# Patient Record
Sex: FEMALE | Race: BLACK OR AFRICAN AMERICAN | Employment: FULL TIME | URBAN - NONMETROPOLITAN AREA
[De-identification: names, ages, dates, MRNs, and addresses within clinical notes are randomized per-mention and may not be internally consistent; named-entity substitution may affect disease eponyms.]

---

## 2020-09-14 ENCOUNTER — HOSPITAL ENCOUNTER (EMERGENCY)
Age: 50
Discharge: HOME | End: 2020-09-14
Attending: INTERNAL MEDICINE
Payer: COMMERCIAL

## 2020-09-14 ENCOUNTER — APPOINTMENT (OUTPATIENT)
Dept: GENERAL RADIOLOGY | Age: 50
End: 2020-09-14
Attending: INTERNAL MEDICINE
Payer: COMMERCIAL

## 2020-09-14 VITALS
HEART RATE: 74 BPM | TEMPERATURE: 99.4 F | BODY MASS INDEX: 29.02 KG/M2 | HEIGHT: 64 IN | RESPIRATION RATE: 18 BRPM | SYSTOLIC BLOOD PRESSURE: 142 MMHG | OXYGEN SATURATION: 97 % | DIASTOLIC BLOOD PRESSURE: 76 MMHG | WEIGHT: 170 LBS

## 2020-09-14 DIAGNOSIS — Z20.822 PERSON UNDER INVESTIGATION FOR COVID-19: Primary | ICD-10-CM

## 2020-09-14 LAB
DEPRECATED S PYO AG THROAT QL EIA: NEGATIVE
FLUAV AG NPH QL IA: NEGATIVE
FLUBV AG NOSE QL IA: NEGATIVE

## 2020-09-14 PROCEDURE — 99284 EMERGENCY DEPT VISIT MOD MDM: CPT

## 2020-09-14 PROCEDURE — 87804 INFLUENZA ASSAY W/OPTIC: CPT

## 2020-09-14 PROCEDURE — 87635 SARS-COV-2 COVID-19 AMP PRB: CPT

## 2020-09-14 PROCEDURE — 71045 X-RAY EXAM CHEST 1 VIEW: CPT

## 2020-09-14 PROCEDURE — 87880 STREP A ASSAY W/OPTIC: CPT

## 2020-09-14 PROCEDURE — 87070 CULTURE OTHR SPECIMN AEROBIC: CPT

## 2020-09-14 PROCEDURE — 74011250637 HC RX REV CODE- 250/637: Performed by: INTERNAL MEDICINE

## 2020-09-14 RX ORDER — BENZONATATE 100 MG/1
100 CAPSULE ORAL
Qty: 30 CAP | Refills: 0 | Status: SHIPPED | OUTPATIENT
Start: 2020-09-14 | End: 2020-09-21

## 2020-09-14 RX ORDER — LANOLIN ALCOHOL/MO/W.PET/CERES
3 CREAM (GRAM) TOPICAL
COMMUNITY

## 2020-09-14 RX ORDER — LANOLIN ALCOHOL/MO/W.PET/CERES
500 CREAM (GRAM) TOPICAL DAILY
COMMUNITY
End: 2022-03-14

## 2020-09-14 RX ORDER — PREDNISONE 20 MG/1
20 TABLET ORAL DAILY
Qty: 5 TAB | Refills: 0 | Status: SHIPPED | OUTPATIENT
Start: 2020-09-14 | End: 2020-09-19

## 2020-09-14 RX ORDER — ALBUTEROL SULFATE 90 UG/1
2 AEROSOL, METERED RESPIRATORY (INHALATION)
COMMUNITY

## 2020-09-14 RX ORDER — PANTOPRAZOLE SODIUM 40 MG/1
40 TABLET, DELAYED RELEASE ORAL DAILY
COMMUNITY

## 2020-09-14 RX ORDER — DEXAMETHASONE SODIUM PHOSPHATE 4 MG/ML
6 INJECTION, SOLUTION INTRA-ARTICULAR; INTRALESIONAL; INTRAMUSCULAR; INTRAVENOUS; SOFT TISSUE
Status: COMPLETED | OUTPATIENT
Start: 2020-09-14 | End: 2020-09-14

## 2020-09-14 RX ORDER — AZITHROMYCIN 250 MG/1
TABLET, FILM COATED ORAL
Qty: 6 TAB | Refills: 0 | Status: SHIPPED | OUTPATIENT
Start: 2020-09-14 | End: 2021-01-20

## 2020-09-14 RX ORDER — AMLODIPINE BESYLATE 5 MG/1
5 TABLET ORAL DAILY
COMMUNITY

## 2020-09-14 RX ORDER — METFORMIN HYDROCHLORIDE 500 MG/1
500 TABLET ORAL DAILY
COMMUNITY

## 2020-09-14 RX ORDER — LOSARTAN POTASSIUM 100 MG/1
100 TABLET ORAL DAILY
COMMUNITY
End: 2021-01-20

## 2020-09-14 RX ORDER — ZOLPIDEM TARTRATE 10 MG/1
10 TABLET ORAL
COMMUNITY
End: 2021-01-20

## 2020-09-14 RX ORDER — GLUCOSAMINE SULFATE 1500 MG
1000 POWDER IN PACKET (EA) ORAL DAILY
COMMUNITY
End: 2021-08-22

## 2020-09-14 RX ADMIN — DEXAMETHASONE SODIUM PHOSPHATE 6 MG: 4 INJECTION, SOLUTION INTRA-ARTICULAR; INTRALESIONAL; INTRAMUSCULAR; INTRAVENOUS; SOFT TISSUE at 12:36

## 2020-09-14 NOTE — ED PROVIDER NOTES
EMERGENCY DEPARTMENT HISTORY AND PHYSICAL EXAM      Date: 9/14/2020  Patient Name: Manjeet Murphy    History of Presenting Illness     Chief Complaint   Patient presents with    Cough       History Provided By: Patient    HPI: Manjeet Murphy, 52 y.o. female presents to the ED with cc of continuing throat pain, chills/aches, cough and shortness of breath. Patient states that for approximately two weeks she has been expieriencing these symptoms. They began with her throat \"feeling on fire\" and symptoms have increased since and have not gotten better. Denies direct contact with a COVID patient, but admits that her son has been in contact with a COVID patient as well as having a previously positive COVID co-worker recently return to work. Denies being a smoker. There are no other complaints, changes, or physical findings at this time. PCP: Marisel Grover MD    No current facility-administered medications on file prior to encounter. Current Outpatient Medications on File Prior to Encounter   Medication Sig Dispense Refill    metFORMIN ER (GLUCOPHAGE XR) 750 mg tablet Take 750 mg by mouth daily.  melatonin 3 mg tablet Take 3 mg by mouth.  zolpidem (Ambien) 10 mg tablet Take 10 mg by mouth nightly.  cholecalciferol (Vitamin D3) 25 mcg (1,000 unit) cap Take 1,000 Units by mouth daily.  pantoprazole (Protonix) 40 mg tablet Take 40 mg by mouth daily.  cyanocobalamin (Vitamin B-12) 500 mcg tablet Take 500 mcg by mouth daily.  amLODIPine (Norvasc) 5 mg tablet Take 5 mg by mouth daily.  losartan (COZAAR) 100 mg tablet Take 100 mg by mouth daily.  albuterol (PROVENTIL HFA, VENTOLIN HFA, PROAIR HFA) 90 mcg/actuation inhaler Take 2 Puffs by inhalation every four (4) hours as needed for Wheezing.          Past History     Past Medical History:  Past Medical History:   Diagnosis Date    Diabetes ([address]     GERD (gastroesophageal reflux disease)     Hypertension     Interstitial cystitis     Migraines        Past Surgical History:  Past Surgical History:   Procedure Laterality Date    HX HYSTERECTOMY      HX TONSILLECTOMY         Family History:  Family History   Problem Relation Age of Onset    Hypertension Mother     Diabetes Mother     Cancer Father     Hypertension Father     Diabetes Father     Asthma Father        Social History:  Social History     Tobacco Use    Smoking status: Never Smoker    Smokeless tobacco: Never Used   Substance Use Topics    Alcohol use: Not Currently    Drug use: Never       Allergies: Allergies   Allergen Reactions    Aspirin Nausea Only    Levaquin [Levofloxacin] Rash    Penicillins Rash         Review of Systems   Review of Systems   Constitutional: Positive for chills, fatigue and fever. HENT: Positive for sore throat. Negative for ear pain and rhinorrhea. Eyes: Negative. Respiratory: Positive for shortness of breath. Negative for chest tightness and wheezing. Cardiovascular: Negative. Gastrointestinal: Negative. Endocrine: Negative. Genitourinary: Negative. Musculoskeletal: Negative. Skin: Negative. Allergic/Immunologic: Negative. Neurological: Positive for weakness and headaches. Negative for syncope. Hematological: Negative. Psychiatric/Behavioral: Negative. Physical Exam   Physical Exam  Constitutional:       Appearance: Normal appearance. She is normal weight. She is ill-appearing. She is not diaphoretic. HENT:      Head: Normocephalic and atraumatic. Right Ear: Tympanic membrane, ear canal and external ear normal.      Left Ear: Tympanic membrane, ear canal and external ear normal.      Nose: Nose normal.      Mouth/Throat:      Mouth: Mucous membranes are moist.      Pharynx: Oropharynx is clear. Eyes:      Extraocular Movements: Extraocular movements intact. Conjunctiva/sclera: Conjunctivae normal.      Pupils: Pupils are equal, round, and reactive to light. Neck:      Musculoskeletal: Normal range of motion and neck supple. No muscular tenderness. Cardiovascular:      Rate and Rhythm: Normal rate and regular rhythm. Pulses: Normal pulses. Heart sounds: Normal heart sounds. Pulmonary:      Effort: Pulmonary effort is normal.      Breath sounds: Normal breath sounds. Abdominal:      General: Bowel sounds are normal.      Palpations: Abdomen is soft. Tenderness: There is no abdominal tenderness. Musculoskeletal: Normal range of motion. Skin:     General: Skin is warm. Coloration: Skin is not jaundiced or pale. Findings: No erythema. Neurological:      Mental Status: She is alert and oriented to person, place, and time. Sensory: No sensory deficit. Motor: Weakness present. Psychiatric:         Mood and Affect: Mood normal.         Behavior: Behavior normal.         Thought Content: Thought content normal.         Judgment: Judgment normal.         Diagnostic Study Results     Labs -     Recent Results (from the past 12 hour(s))   INFLUENZA A & B AG (RAPID TEST)    Collection Time: 09/14/20 12:45 PM   Result Value Ref Range    Influenza A Antigen Negative      Influenza B Antigen Negative     STREP AG SCREEN, GROUP A    Collection Time: 09/14/20 12:45 PM    Specimen: Throat   Result Value Ref Range    Group A Strep Ag ID Negative         Radiologic Studies -   XR CHEST PORT   Final Result   IMPRESSION:      No active cardiopulmonary disease. CT Results  (Last 48 hours)    None        CXR Results  (Last 48 hours)               09/14/20 1232  XR CHEST PORT Final result    Impression:  IMPRESSION:       No active cardiopulmonary disease. Narrative:  EXAM: XR CHEST PORT       CLINICAL INDICATION/HISTORY: cough; possible covid     > Additional: None. COMPARISON: None. TECHNIQUE: Portable chest       _______________       FINDINGS:       SUPPORT DEVICES: None.        HEART AND MEDIASTINUM: Normal size and contour. Normal pulmonary vasculature. LUNGS AND PLEURAL SPACES: The lungs are well expanded and clear. No focal   consolidation, effusion, or pneumothorax. BONY THORAX AND SOFT TISSUES: No acute osseous abnormality. _______________                 Medical Decision Making   I am the first provider for this patient. I reviewed the vital signs, available nursing notes, past medical history, past surgical history, family history and social history. Vital Signs-Reviewed the patient's vital signs. Patient Vitals for the past 12 hrs:   Temp Pulse Resp BP SpO2   09/14/20 1145 99.4 °F (37.4 °C) 94 20 124/82 98 %       Records Reviewed: Nursing Notes      ED Course:   Initial assessment performed. The patients presenting problems have been discussed, and they are in agreement with the care plan formulated and outlined with them. I have encouraged them to ask questions as they arise throughout their visit. Leonarda Prieto MD      Disposition:  Discharged     Remove if not discharged  DISCHARGE PLAN:  1. Current Discharge Medication List      START taking these medications    Details   azithromycin (Zithromax Z-Tunde) 250 mg tablet Two tablets the first day and then one tablet daily for the next 4 days  Qty: 6 Tab, Refills: 0      predniSONE (DELTASONE) 20 mg tablet Take 20 mg by mouth daily for 5 days. With Breakfast  Qty: 5 Tab, Refills: 0      benzonatate (Tessalon Perles) 100 mg capsule Take 1 Cap by mouth three (3) times daily as needed for Cough for up to 7 days. Qty: 30 Cap, Refills: 0           2. Follow-up Information     Follow up With Specialties Details Why Contact Info    Connie Dunham MD Internal Medicine Schedule an appointment as soon as possible for a visit in 2 days  [address] [address] [phone]          3. Return to ED if worse     Diagnosis     Clinical Impression:   1. Person under investigation for COVID-19        Attestations:     By signing my name below, Bre Mk, attest that this documentation has been prepared under the direction and in presence of Dr. Gwendolyn Cuevas on 09/14/20. Electronically signed: Sarika Becerra, 09/14/20, 12:18 PM    Leonarda Prieto MD    Please note that this dictation was completed with [employer], the computer voice recognition software. Quite often unanticipated grammatical, syntax, homophones, and other interpretive errors are inadvertently transcribed by the computer software. Please disregard these errors. Please excuse any errors that have escaped final proofreading. Thank you.

## 2020-09-14 NOTE — LETTER
[facility] EMERGENCY DEPT 
[facility] [address] 
[phone] Work/School Note Date: 9/14/2020 To Whom It May concern: 
 
Shan Dejesus was evaulated by the following provider(s): 
Attending Provider: Leopoldo Ramirez, [address] [place] [address] virus is suspected. Per the CDC guidelines we recommend home isolation until the following conditions are all met: 1. At least 10 days have passed since symptoms first appeared and 2. At least 24 hours have passed since last fever without the use of fever-reducing medications and 
3. Symptoms (e.g., cough, shortness of breath) have improved Sincerely, 
 
 
 
 
Tamia Mota RN

## 2020-09-14 NOTE — ED TRIAGE NOTES
Pt reports that for two weeks she has had sinus congestion and a cough, states that yesterday it started to hurt when coughs. Pt reports feeling tired and weak.

## 2020-09-16 LAB
CULTURE,CULT: NORMAL
SPECIAL REQUESTS,SREQ: NORMAL

## 2020-09-22 LAB — SARS-COV-2, COV2NT: NOT DETECTED

## 2020-10-08 ENCOUNTER — HOSPITAL ENCOUNTER (OUTPATIENT)
Dept: LAB | Age: 50
Discharge: HOME | End: 2020-10-08

## 2021-01-20 ENCOUNTER — HOSPITAL ENCOUNTER (EMERGENCY)
Age: 51
Discharge: HOME | End: 2021-01-20
Attending: FAMILY MEDICINE
Payer: COMMERCIAL

## 2021-01-20 ENCOUNTER — APPOINTMENT (OUTPATIENT)
Dept: GENERAL RADIOLOGY | Age: 51
End: 2021-01-20
Attending: FAMILY MEDICINE
Payer: COMMERCIAL

## 2021-01-20 VITALS
DIASTOLIC BLOOD PRESSURE: 77 MMHG | SYSTOLIC BLOOD PRESSURE: 115 MMHG | HEART RATE: 78 BPM | BODY MASS INDEX: 27.14 KG/M2 | TEMPERATURE: 97.8 F | HEIGHT: 64 IN | OXYGEN SATURATION: 100 % | WEIGHT: 159 LBS | RESPIRATION RATE: 18 BRPM

## 2021-01-20 DIAGNOSIS — S20.212A CONTUSION OF LEFT CHEST WALL, INITIAL ENCOUNTER: Primary | ICD-10-CM

## 2021-01-20 PROCEDURE — 71110 X-RAY EXAM RIBS BIL 3 VIEWS: CPT

## 2021-01-20 PROCEDURE — 99281 EMR DPT VST MAYX REQ PHY/QHP: CPT

## 2021-01-20 RX ORDER — TRAMADOL HYDROCHLORIDE 50 MG/1
50 TABLET ORAL
Qty: 20 TAB | Refills: 0 | Status: SHIPPED | OUTPATIENT
Start: 2021-01-20 | End: 2021-01-25

## 2021-01-20 NOTE — Clinical Note
[facility] EMERGENCY DEPT 
[facility] [place] [address] 
[phone] Work/School Note Date: 1/20/2021 To Whom It May concern: 
 
 
Duran Whitehead was seen and treated today in the emergency room by the following provider(s): 
Attending Provider: Mary Barlow DO. Duran Whitehead is excused from work/school on 01/20/21. She is clear to return to work/school on 01/21/21.    
 
 
Sincerely, 
 
 
 
 
Bruce Aggarwal DO

## 2021-01-20 NOTE — ED TRIAGE NOTES
Reports fell on Saturday onto cement, states chair gave away. C/O pain under left rib cage, hurts to breathe - feels like having \"spasms\", bruising noted both arms, pain right leg and back. Denies hitting head or LOC. Has tried tylenol, motrin and flexeril with no relief. States left arm feels like has pins and needles from elbow up to shoulder.
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
## 2021-03-16 ENCOUNTER — OFFICE VISIT (OUTPATIENT)
Dept: ORTHOPEDIC SURGERY | Age: 51
End: 2021-03-16
Payer: COMMERCIAL

## 2021-03-16 VITALS — WEIGHT: 160 LBS | HEIGHT: 64 IN | BODY MASS INDEX: 27.31 KG/M2

## 2021-03-16 DIAGNOSIS — M54.42 LOW BACK PAIN WITH LEFT-SIDED SCIATICA, UNSPECIFIED BACK PAIN LATERALITY, UNSPECIFIED CHRONICITY: Primary | ICD-10-CM

## 2021-03-16 DIAGNOSIS — M54.40 LOW BACK PAIN WITH SCIATICA, SCIATICA LATERALITY UNSPECIFIED, UNSPECIFIED BACK PAIN LATERALITY, UNSPECIFIED CHRONICITY: ICD-10-CM

## 2021-03-16 PROCEDURE — 99203 OFFICE O/P NEW LOW 30 MIN: CPT | Performed by: ORTHOPAEDIC SURGERY

## 2021-03-16 NOTE — PATIENT INSTRUCTIONS
Knee Pain or Injury: Care Instructions Your Care Instructions Injuries are a common cause of knee problems. Sudden (acute) injuries may be caused by a direct blow to the knee. They can also be caused by abnormal twisting, bending, or falling on the knee. Pain, bruising, or swelling may be severe, and may start within minutes of the injury. Overuse is another cause of knee pain. Other causes are climbing stairs, kneeling, and other activities that use the knee. Everyday wear and tear, especially as you get older, also can cause knee pain. Rest, along with home treatment, often relieves pain and allows your knee to heal. If you have a serious knee injury, you may need tests and treatment. Follow-up care is a key part of your treatment and safety. Be sure to make and go to all appointments, and call your doctor if you are having problems. It's also a good idea to know your test results and keep a list of the medicines you take. How can you care for yourself at home? · Be safe with medicines. Read and follow all instructions on the label. ? If the doctor gave you a prescription medicine for pain, take it as prescribed. ? If you are not taking a prescription pain medicine, ask your doctor if you can take an over-the-counter medicine. · Rest and protect your knee. Take a break from any activity that may cause pain. · Put ice or a cold pack on your knee for 10 to 20 minutes at a time. Put a thin cloth between the ice and your skin. · Prop up a sore knee on a pillow when you ice it or anytime you sit or lie down for the next 3 days. Try to keep it above the level of your heart. This will help reduce swelling. · If your knee is not swollen, you can put moist heat, a heating pad, or a warm cloth on your knee. · If your doctor recommends an elastic bandage, sleeve, or other type of support for your knee, wear it as directed.  
· Follow your doctor's instructions about how much weight you can put on your leg. Use a cane, crutches, or a walker as instructed. · Follow your doctor's instructions about activity during your healing process. If you can do mild exercise, slowly increase your activity. · Reach and stay at a healthy weight. Extra weight can strain the joints, especially the knees and hips, and make the pain worse. Losing even a few pounds may help. When should you call for help? Call 911 anytime you think you may need emergency care. For example, call if: 
  · You have symptoms of a blood clot in your lung (called a pulmonary embolism). These may include: 
? Sudden chest pain. ? Trouble breathing. ? Coughing up blood. Call your doctor now or seek immediate medical care if: 
  · You have severe or increasing pain.  
  · Your leg or foot turns cold or changes color.  
  · You cannot stand or put weight on your knee.  
  · Your knee looks twisted or bent out of shape.  
  · You cannot move your knee.  
  · You have signs of infection, such as: 
? Increased pain, swelling, warmth, or redness. ? Red streaks leading from the knee. ? Pus draining from a place on your knee. ? A fever.  
  · You have signs of a blood clot in your leg (called a deep vein thrombosis), such as: 
? Pain in your calf, back of the knee, thigh, or groin. ? Redness and swelling in your leg or groin. Watch closely for changes in your health, and be sure to contact your doctor if: 
  · You have tingling, weakness, or numbness in your knee.  
  · You have any new symptoms, such as swelling.  
  · You have bruises from a knee injury that last longer than 2 weeks.  
  · You do not get better as expected. Where can you learn more? Go to http://www.gray.com/ Enter K195 in the search box to learn more about \"Knee Pain or Injury: Care Instructions. \" Current as of: June 26, 2019               Content Version: 12.6 © 4247-7278 [employer].   
Care instructions adapted under license by [employer] (which disclaims liability or warranty for this information). If you have questions about a medical condition or this instruction, always ask your healthcare professional. [address] any warranty or liability for your use of this information.
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
## 2021-04-21 ENCOUNTER — HOSPITAL ENCOUNTER (OUTPATIENT)
Dept: LAB | Age: 51
Discharge: HOME | End: 2021-04-21

## 2021-04-21 PROCEDURE — 99001 SPECIMEN HANDLING PT-LAB: CPT

## 2021-04-21 PROCEDURE — 36415 COLL VENOUS BLD VENIPUNCTURE: CPT

## 2021-04-22 DIAGNOSIS — M54.40 LOW BACK PAIN WITH SCIATICA, SCIATICA LATERALITY UNSPECIFIED, UNSPECIFIED BACK PAIN LATERALITY, UNSPECIFIED CHRONICITY: ICD-10-CM

## 2021-04-22 DIAGNOSIS — M54.42 LOW BACK PAIN WITH LEFT-SIDED SCIATICA, UNSPECIFIED BACK PAIN LATERALITY, UNSPECIFIED CHRONICITY: ICD-10-CM

## 2021-06-05 ENCOUNTER — HOSPITAL ENCOUNTER (EMERGENCY)
Age: 51
Discharge: HOME | End: 2021-06-05
Attending: EMERGENCY MEDICINE
Payer: COMMERCIAL

## 2021-06-05 VITALS
DIASTOLIC BLOOD PRESSURE: 75 MMHG | TEMPERATURE: 98.4 F | RESPIRATION RATE: 16 BRPM | OXYGEN SATURATION: 100 % | SYSTOLIC BLOOD PRESSURE: 166 MMHG | HEIGHT: 64 IN | BODY MASS INDEX: 27.31 KG/M2 | WEIGHT: 160 LBS | HEART RATE: 61 BPM

## 2021-06-05 DIAGNOSIS — N30.11 CHRONIC INTERSTITIAL CYSTITIS WITH HEMATURIA: Primary | ICD-10-CM

## 2021-06-05 LAB
ANION GAP SERPL CALC-SCNC: 10 MMOL/L
APPEARANCE UR: CLEAR
BACTERIA URNS QL MICRO: ABNORMAL /HPF
BASOPHILS # BLD: 0 K/UL (ref 0–0.1)
BASOPHILS NFR BLD: 0 % (ref 0–2)
BILIRUB UR QL: NEGATIVE
BUN SERPL-MCNC: 9 MG/DL (ref 9–21)
BUN/CREAT SERPL: 10
CA-I BLD-MCNC: 9.5 MG/DL (ref 8.5–10.5)
CHLORIDE SERPL-SCNC: 106 MMOL/L (ref 94–111)
CO2 SERPL-SCNC: 23 MMOL/L (ref 21–33)
COLOR UR: ABNORMAL
CREAT SERPL-MCNC: 0.9 MG/DL (ref 0.7–1.2)
EOSINOPHIL # BLD: 0.1 K/UL (ref 0–0.4)
EOSINOPHIL NFR BLD: 1 % (ref 0–5)
EPITH CASTS URNS QL MICRO: ABNORMAL /LPF (ref 0–20)
ERYTHROCYTE [DISTWIDTH] IN BLOOD BY AUTOMATED COUNT: 12.4 % (ref 11.6–14.5)
GLUCOSE SERPL-MCNC: 103 MG/DL (ref 70–110)
GLUCOSE UR STRIP.AUTO-MCNC: NEGATIVE MG/DL
HCT VFR BLD AUTO: 40.2 % (ref 35–45)
HGB BLD-MCNC: 13 G/DL (ref 12–16)
HGB UR QL STRIP: ABNORMAL
IMM GRANULOCYTES # BLD AUTO: 0 K/UL
IMM GRANULOCYTES NFR BLD AUTO: 0 %
KETONES UR QL STRIP.AUTO: NEGATIVE MG/DL
LEUKOCYTE ESTERASE UR QL STRIP.AUTO: NEGATIVE
LYMPHOCYTES # BLD: 2.8 K/UL (ref 0.9–3.6)
LYMPHOCYTES NFR BLD: 38 % (ref 21–52)
MCH RBC QN AUTO: 29.2 PG (ref 24–34)
MCHC RBC AUTO-ENTMCNC: 32.3 G/DL (ref 31–37)
MCV RBC AUTO: 90.3 FL (ref 74–97)
MONOCYTES # BLD: 0.4 K/UL (ref 0.05–1.2)
MONOCYTES NFR BLD: 5 % (ref 3–10)
NEUTS SEG # BLD: 4.1 K/UL (ref 1.8–8)
NEUTS SEG NFR BLD: 56 % (ref 40–73)
NITRITE UR QL STRIP.AUTO: NEGATIVE
PH UR STRIP: 8 [PH] (ref 5–9)
PLATELET # BLD AUTO: 267 K/UL (ref 135–420)
PMV BLD AUTO: 9.6 FL (ref 9.2–11.8)
POTASSIUM SERPL-SCNC: 3.7 MMOL/L (ref 3.2–5.1)
PROT UR STRIP-MCNC: NEGATIVE MG/DL
RBC # BLD AUTO: 4.45 M/UL (ref 4.2–5.3)
RBC #/AREA URNS HPF: ABNORMAL /HPF (ref 0–2)
SODIUM SERPL-SCNC: 139 MMOL/L (ref 135–145)
SP GR UR REFRACTOMETRY: 1.01 (ref 1–1.03)
UROBILINOGEN UR QL STRIP.AUTO: 0.2 EU/DL (ref 0.2–1)
WBC # BLD AUTO: 7.4 K/UL (ref 4.6–13.2)
WBC URNS QL MICRO: ABNORMAL /HPF (ref 0–4)

## 2021-06-05 PROCEDURE — 96375 TX/PRO/DX INJ NEW DRUG ADDON: CPT

## 2021-06-05 PROCEDURE — 74011250636 HC RX REV CODE- 250/636: Performed by: EMERGENCY MEDICINE

## 2021-06-05 PROCEDURE — 96374 THER/PROPH/DIAG INJ IV PUSH: CPT

## 2021-06-05 PROCEDURE — 99284 EMERGENCY DEPT VISIT MOD MDM: CPT

## 2021-06-05 PROCEDURE — 85025 COMPLETE CBC W/AUTO DIFF WBC: CPT

## 2021-06-05 PROCEDURE — 81001 URINALYSIS AUTO W/SCOPE: CPT

## 2021-06-05 PROCEDURE — 80048 BASIC METABOLIC PNL TOTAL CA: CPT

## 2021-06-05 RX ORDER — ONDANSETRON 2 MG/ML
4 INJECTION INTRAMUSCULAR; INTRAVENOUS
Status: COMPLETED | OUTPATIENT
Start: 2021-06-05 | End: 2021-06-05

## 2021-06-05 RX ORDER — PHENAZOPYRIDINE HYDROCHLORIDE 200 MG/1
200 TABLET, FILM COATED ORAL 3 TIMES DAILY
Qty: 6 TABLET | Refills: 0 | Status: SHIPPED | OUTPATIENT
Start: 2021-06-05 | End: 2021-06-07

## 2021-06-05 RX ORDER — LANOLIN ALCOHOL/MO/W.PET/CERES
325 CREAM (GRAM) TOPICAL DAILY
COMMUNITY
End: 2022-01-16

## 2021-06-05 RX ORDER — KETOROLAC TROMETHAMINE 30 MG/ML
30 INJECTION, SOLUTION INTRAMUSCULAR; INTRAVENOUS
Status: COMPLETED | OUTPATIENT
Start: 2021-06-05 | End: 2021-06-05

## 2021-06-05 RX ADMIN — SODIUM CHLORIDE 1000 ML: 9 INJECTION, SOLUTION INTRAVENOUS at 17:07

## 2021-06-05 RX ADMIN — ONDANSETRON 4 MG: 2 INJECTION INTRAMUSCULAR; INTRAVENOUS at 17:07

## 2021-06-05 RX ADMIN — KETOROLAC TROMETHAMINE 30 MG: 30 INJECTION, SOLUTION INTRAMUSCULAR; INTRAVENOUS at 17:09

## 2021-06-05 NOTE — ED TRIAGE NOTES
Pt states she started with pressure in her bladder a couple of days ago, she had her urine tested yesterday and she was supposed to start abx, but she hasn't yet because she feels so bad. pt also has multiple other complaints such as headache and sore throat, pt has had all of these symptoms for several days, but she did not mention them to her PCP yesterday because she was in a hurry. ..... Galvan Leisure

## 2021-06-05 NOTE — DISCHARGE INSTRUCTIONS
Patient Education        Interstitial Cystitis: Care Instructions  Your Care Instructions     Interstitial cystitis is a long-term irritation of the bladder. It can cause mild to severe pain that comes and goes. You also may feel a sudden urge to urinate or need to urinate often. Sometimes the walls of the bladder become scarred or get stiff. Doctors do not know what causes interstitial cystitis. But they do know that it is not caused by an infection. The problem is much more common in women than in men. Your doctor may do tests to make sure that you do not have an infection, kidney stones, or bladder cancer. Because the cause of interstitial cystitis is not known, your doctor may try several treatments. It may take several weeks or months to find a treatment that works. If diet and lifestyle changes do not help, you may need medicine. Your doctor may also put liquid or medicine into your bladder for a short time to treat the pain. Follow-up care is a key part of your treatment and safety. Be sure to make and go to all appointments, and call your doctor if you are having problems. It's also a good idea to know your test results and keep a list of the medicines you take. How can you care for yourself at home? · Take your medicines exactly as prescribed. Call your doctor if you think you are having a problem with your medicine. · If your doctor prescribed antibiotics, take them as directed. Do not stop taking them just because you feel better. You need to take the full course of antibiotics. · Avoid eating spicy foods or high-acid foods, such as tomatoes and oranges, if these foods seem to make your pain worse. Also, limit caffeine and alcohol. · If a certain food seems to cause pain in your bladder, stop eating it to see if the pain goes away. · Do not smoke. Smoking can irritate the bladder and cause bladder cancer. If you need help quitting, talk to your doctor about stop-smoking programs and medicines. These can increase your chances of quitting for good. · Try bladder training. Set certain times to go to the bathroom and slowly increase the time between visits. This may help lengthen the time your bladder can hold urine. · You might try a treatment called TENS. It sends a very mild electric current through wires placed near the pubic area. This is done for at least several minutes 2 times each day. · Consider a support group. Sharing your experiences with other people who have the same problem may help you learn more and cope better. · Wash your pubic area with a mild soap. Avoid deodorant soaps or soaps with heavy perfumes. · Wear loose-fitting clothing that does not put pressure on your bladder. When should you call for help? Call your doctor now or seek immediate medical care if:    · You have symptoms of a urinary infection. For example:  ? You have blood or pus in your urine. ? You have pain in your back just below your rib cage. This is called flank pain. ? You have a fever, chills, or body aches. ? It hurts to urinate. ? You have groin or belly pain. Watch closely for changes in your health, and be sure to contact your doctor if:    · You do not get better as expected. Where can you learn more? Go to http://www.gray.com/  Enter N038 in the search box to learn more about \"Interstitial Cystitis: Care Instructions. \"  Current as of: June 29, 2020               Content Version: 12.8  © 6765-7852 [employer]. Care instructions adapted under license by [employer] (which disclaims liability or warranty for this information). If you have questions about a medical condition or this instruction, always ask your healthcare professional. [place] [address] any warranty or liability for your use of this information.

## 2021-06-05 NOTE — ED NOTES
Attempted 3 more time for an IV, was able to obtain blood with assistance fro lab, however no IV. MD aware.   Will wait for lab results, hold IV meds at this time
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
## 2021-07-09 ENCOUNTER — TRANSCRIBE ORDER (OUTPATIENT)
Dept: SCHEDULING | Age: 51
End: 2021-07-09

## 2021-07-09 DIAGNOSIS — Z12.31 VISIT FOR SCREENING MAMMOGRAM: Primary | ICD-10-CM

## 2021-07-14 ENCOUNTER — HOSPITAL ENCOUNTER (OUTPATIENT)
Dept: MAMMOGRAPHY | Age: 51
Discharge: HOME | End: 2021-07-14
Payer: COMMERCIAL

## 2021-07-14 DIAGNOSIS — Z12.31 VISIT FOR SCREENING MAMMOGRAM: ICD-10-CM

## 2021-07-14 PROCEDURE — 77067 SCR MAMMO BI INCL CAD: CPT

## 2021-07-20 ENCOUNTER — OFFICE VISIT (OUTPATIENT)
Dept: ORTHOPEDIC SURGERY | Age: 51
End: 2021-07-20
Payer: COMMERCIAL

## 2021-07-20 DIAGNOSIS — M25.562 LEFT KNEE PAIN, UNSPECIFIED CHRONICITY: Primary | ICD-10-CM

## 2021-07-20 DIAGNOSIS — M17.12 PRIMARY OSTEOARTHRITIS OF LEFT KNEE: ICD-10-CM

## 2021-07-20 PROCEDURE — 20611 DRAIN/INJ JOINT/BURSA W/US: CPT | Performed by: ORTHOPAEDIC SURGERY

## 2021-07-20 PROCEDURE — 99214 OFFICE O/P EST MOD 30 MIN: CPT | Performed by: ORTHOPAEDIC SURGERY

## 2021-07-20 RX ORDER — LIDOCAINE HYDROCHLORIDE 10 MG/ML
9 INJECTION INFILTRATION; PERINEURAL ONCE
Status: COMPLETED | OUTPATIENT
Start: 2021-07-20 | End: 2021-07-20

## 2021-07-20 RX ORDER — TRIAMCINOLONE ACETONIDE 40 MG/ML
40 INJECTION, SUSPENSION INTRA-ARTICULAR; INTRAMUSCULAR ONCE
Status: COMPLETED | OUTPATIENT
Start: 2021-07-20 | End: 2021-07-20

## 2021-07-20 RX ADMIN — TRIAMCINOLONE ACETONIDE 40 MG: 40 INJECTION, SUSPENSION INTRA-ARTICULAR; INTRAMUSCULAR at 08:28

## 2021-07-20 RX ADMIN — LIDOCAINE HYDROCHLORIDE 9 ML: 10 INJECTION INFILTRATION; PERINEURAL at 08:28

## 2021-07-20 NOTE — LETTER
Denise Nunes [facility]   1970   [id number]       7/20/2021       I hereby authorize and direct Raffi Dunham MD, Jez Bottom, and whomever he may designate as his associate to perform upon myself the following procedure:    Injection of: Kenalog, Supartz, Euflexxa, Orthovisc in the Right/Left ____________________. If any unforeseen condition arises in the course of the procedure, I further authorize him and his associated and/or assistant(s) to do whatever he/she deems advisable. The nature, purpose, benefits, risks, side effects, likelihood of achieving goals, and potential problems that might occur during recuperation, risks for not receiving the proposed care, treatment and services and alternatives of the procedure have been fully explained to me by my physician including, but not limited to:    Swelling, joint pain, skin pigment changes, worsening of condition, and failure to improve. I acknowledge that no guarantee or assurance has been made to me as to the results that may be obtained or the likelihood of success.                 _______________________________________     Signature of patient or authorized representative                [employer] [facility] fax: [phone]

## 2021-07-20 NOTE — PATIENT INSTRUCTIONS
Knee Arthritis: Care Instructions  Your Care Instructions     Knee arthritis is a breakdown of the cartilage that cushions your knee joint. When the cartilage wears down, your bones rub against each other. This causes pain and stiffness. Knee arthritis tends to get worse with time. Treatment for knee arthritis involves reducing pain, making the leg muscles stronger, and staying at a healthy body weight. The treatment usually does not improve the health of the cartilage, but it can reduce pain and improve how well your knee works. You can take simple measures to protect your knee joints, ease your pain, and help you stay active. Follow-up care is a key part of your treatment and safety. Be sure to make and go to all appointments, and call your doctor if you are having problems. It's also a good idea to know your test results and keep a list of the medicines you take. How can you care for yourself at home? · Know that knee arthritis will cause more pain on some days than on others. · Stay at a healthy weight. Lose weight if you are overweight. When you stand up, the pressure on your knees from every pound of body weight is multiplied four times. So if you lose 10 pounds, you will reduce the pressure on your knees by 40 pounds. · Talk to your doctor or physical therapist about exercises that will help ease joint pain. ? Stretch to help prevent stiffness and to prevent injury before you exercise. You may enjoy gentle forms of yoga to help keep your knee joints and muscles flexible. ? Walk instead of jog.  ? Ride a bike. This makes your thigh muscles stronger and takes pressure off your knee. ? Wear well-fitting and comfortable shoes. ? Exercise in chest-deep water. This can help you exercise longer with less pain. ? Avoid exercises that include squatting or kneeling. They can put a lot of strain on your knees.   ? Talk to your doctor to make sure that the exercise you do is not making the arthritis worse.  · Do not sit for long periods of time. Try to walk once in a while to keep your knee from getting stiff. · Ask your doctor or physical therapist whether shoe inserts may reduce your arthritis pain. · If you can afford it, get new athletic shoes at least every year. This can help reduce the strain on your knees. · Use a device to help you do everyday activities. ? A cane or walking stick can help you keep your balance when you walk. Hold the cane or walking stick in the hand opposite the painful knee. ? If you feel like you may fall when you walk, try using crutches or a front-wheeled walker. These can prevent falls that could cause more damage to your knee. ? A knee brace may help keep your knee stable and prevent pain. ? You also can use other things to make life easier, such as a higher toilet seat and handrails in the bathtub or shower. · Take pain medicines exactly as directed. ? Do not wait until you are in severe pain. You will get better results if you take it sooner. ? If you are not taking a prescription pain medicine, take an over-the-counter medicine such as acetaminophen (Tylenol), ibuprofen (Advil, Motrin), or naproxen (Aleve). Read and follow all instructions on the label. ? Do not take two or more pain medicines at the same time unless the doctor told you to. Many pain medicines have acetaminophen, which is Tylenol. Too much acetaminophen (Tylenol) can be harmful. ? Tell your doctor if you take a blood thinner, have diabetes, or have allergies to shellfish. · Ask your doctor if you might benefit from a shot of steroid medicine into your knee. This may provide pain relief for several months. · Many people take the supplements glucosamine and chondroitin for osteoarthritis. Some people feel they help, but the medical research does not show that they work. Talk to your doctor before you take these supplements. When should you call for help?    Call your doctor now or seek immediate medical care if:    · You have sudden swelling, warmth, or pain in your knee.     · You have knee pain and a fever or rash.     · You have such bad pain that you cannot use your knee. Watch closely for changes in your health, and be sure to contact your doctor if you have any problems. Where can you learn more? Go to http://www.gray.com/  Enter W187 in the search box to learn more about \"Knee Arthritis: Care Instructions. \"  Current as of: August 5, 2020               Content Version: 12.8  © 2006-2021 [employer]. Care instructions adapted under license by [employer] (acquired by [employer] in 2014) (which disclaims liability or warranty for this information). If you have questions about a medical condition or this instruction, always ask your healthcare professional. [address] any warranty or liability for your use of this information.
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
## 2021-07-27 ENCOUNTER — HOSPITAL ENCOUNTER (EMERGENCY)
Age: 51
Discharge: HOME | End: 2021-07-27
Attending: FAMILY MEDICINE
Payer: COMMERCIAL

## 2021-07-27 ENCOUNTER — APPOINTMENT (OUTPATIENT)
Dept: GENERAL RADIOLOGY | Age: 51
End: 2021-07-27
Attending: FAMILY MEDICINE
Payer: COMMERCIAL

## 2021-07-27 VITALS
RESPIRATION RATE: 20 BRPM | WEIGHT: 165 LBS | BODY MASS INDEX: 28.17 KG/M2 | DIASTOLIC BLOOD PRESSURE: 73 MMHG | SYSTOLIC BLOOD PRESSURE: 118 MMHG | HEIGHT: 64 IN | TEMPERATURE: 98.1 F | HEART RATE: 64 BPM | OXYGEN SATURATION: 99 %

## 2021-07-27 DIAGNOSIS — R20.2 PARESTHESIA: ICD-10-CM

## 2021-07-27 DIAGNOSIS — K21.9 GASTROESOPHAGEAL REFLUX DISEASE WITHOUT ESOPHAGITIS: Primary | ICD-10-CM

## 2021-07-27 LAB
ALBUMIN SERPL-MCNC: 4.4 G/DL (ref 3.5–4.7)
ALBUMIN/GLOB SERPL: 1.2 {RATIO}
ALP SERPL-CCNC: 73 U/L (ref 38–126)
ALT SERPL-CCNC: 17 U/L (ref 3–52)
AMORPH CRY URNS QL MICRO: ABNORMAL
ANION GAP SERPL CALC-SCNC: 7 MMOL/L
APPEARANCE UR: CLEAR
AST SERPL W P-5'-P-CCNC: 23 U/L (ref 14–74)
ATRIAL RATE: 59 BPM
ATRIAL RATE: 69 BPM
BACTERIA URNS QL MICRO: NEGATIVE /HPF
BASOPHILS # BLD: 0 K/UL (ref 0–0.1)
BASOPHILS NFR BLD: 0 % (ref 0–2)
BILIRUB SERPL-MCNC: 0.7 MG/DL (ref 0.2–1)
BILIRUB UR QL: NEGATIVE
BUN SERPL-MCNC: 13 MG/DL (ref 9–21)
BUN/CREAT SERPL: 16
CA-I BLD-MCNC: 9.3 MG/DL (ref 8.5–10.5)
CALCULATED P AXIS, ECG09: 25 DEGREES
CALCULATED P AXIS, ECG09: 32 DEGREES
CALCULATED R AXIS, ECG10: 31 DEGREES
CALCULATED R AXIS, ECG10: 38 DEGREES
CALCULATED T AXIS, ECG11: 27 DEGREES
CALCULATED T AXIS, ECG11: 35 DEGREES
CHLORIDE SERPL-SCNC: 101 MMOL/L (ref 94–111)
CO2 SERPL-SCNC: 28 MMOL/L (ref 21–33)
COLOR UR: YELLOW
CREAT SERPL-MCNC: 0.8 MG/DL (ref 0.7–1.2)
DIAGNOSIS, 93000: NORMAL
DIAGNOSIS, 93000: NORMAL
EOSINOPHIL # BLD: 0.1 K/UL (ref 0–0.4)
EOSINOPHIL NFR BLD: 1 % (ref 0–5)
EPITH CASTS URNS QL MICRO: ABNORMAL /LPF (ref 0–20)
ERYTHROCYTE [DISTWIDTH] IN BLOOD BY AUTOMATED COUNT: 12.7 % (ref 11.6–14.5)
GLOBULIN SER CALC-MCNC: 3.6 G/DL
GLUCOSE SERPL-MCNC: 109 MG/DL (ref 70–110)
GLUCOSE UR STRIP.AUTO-MCNC: NEGATIVE MG/DL
HCT VFR BLD AUTO: 38.7 % (ref 35–45)
HGB BLD-MCNC: 12.4 G/DL (ref 12–16)
HGB UR QL STRIP: NEGATIVE
IMM GRANULOCYTES # BLD AUTO: 0 K/UL
IMM GRANULOCYTES NFR BLD AUTO: 0 %
KETONES UR QL STRIP.AUTO: NEGATIVE MG/DL
LEUKOCYTE ESTERASE UR QL STRIP.AUTO: ABNORMAL
LYMPHOCYTES # BLD: 3.2 K/UL (ref 0.9–3.6)
LYMPHOCYTES NFR BLD: 33 % (ref 21–52)
MCH RBC QN AUTO: 29.3 PG (ref 24–34)
MCHC RBC AUTO-ENTMCNC: 32 G/DL (ref 31–37)
MCV RBC AUTO: 91.5 FL (ref 74–97)
MONOCYTES # BLD: 0.5 K/UL (ref 0.05–1.2)
MONOCYTES NFR BLD: 5 % (ref 3–10)
NEUTS SEG # BLD: 5.9 K/UL (ref 1.8–8)
NEUTS SEG NFR BLD: 61 % (ref 40–73)
NITRITE UR QL STRIP.AUTO: NEGATIVE
P-R INTERVAL, ECG05: 153 MS
P-R INTERVAL, ECG05: 163 MS
PH UR STRIP: 7.5 [PH] (ref 5–8)
PLATELET # BLD AUTO: 269 K/UL (ref 135–420)
PMV BLD AUTO: 9.6 FL (ref 9.2–11.8)
POTASSIUM SERPL-SCNC: 3.9 MMOL/L (ref 3.2–5.1)
PROT SERPL-MCNC: 8 G/DL (ref 6.1–8.4)
PROT UR STRIP-MCNC: NEGATIVE MG/DL
Q-T INTERVAL, ECG07: 407 MS
Q-T INTERVAL, ECG07: 427 MS
QRS DURATION, ECG06: 85 MS
QRS DURATION, ECG06: 85 MS
QTC CALCULATION (BEZET), ECG08: 427 MS
QTC CALCULATION (BEZET), ECG08: 433 MS
RBC # BLD AUTO: 4.23 M/UL (ref 4.2–5.3)
RBC #/AREA URNS HPF: ABNORMAL /HPF (ref 0–2)
SODIUM SERPL-SCNC: 136 MMOL/L (ref 135–145)
SP GR UR REFRACTOMETRY: 1.01 (ref 1–1.03)
TROPONIN I SERPL-MCNC: <0.02 NG/ML (ref 0.02–0.05)
TROPONIN I SERPL-MCNC: <0.02 NG/ML (ref 0.02–0.05)
UROBILINOGEN UR QL STRIP.AUTO: 1 EU/DL (ref 0.2–1)
VENTRICULAR RATE, ECG03: 60 BPM
VENTRICULAR RATE, ECG03: 68 BPM
WBC # BLD AUTO: 9.8 K/UL (ref 4.6–13.2)
WBC URNS QL MICRO: ABNORMAL /HPF (ref 0–4)

## 2021-07-27 PROCEDURE — 99285 EMERGENCY DEPT VISIT HI MDM: CPT

## 2021-07-27 PROCEDURE — 71045 X-RAY EXAM CHEST 1 VIEW: CPT

## 2021-07-27 PROCEDURE — 84484 ASSAY OF TROPONIN QUANT: CPT

## 2021-07-27 PROCEDURE — 93005 ELECTROCARDIOGRAM TRACING: CPT

## 2021-07-27 PROCEDURE — 80053 COMPREHEN METABOLIC PANEL: CPT

## 2021-07-27 PROCEDURE — 81001 URINALYSIS AUTO W/SCOPE: CPT

## 2021-07-27 PROCEDURE — 85025 COMPLETE CBC W/AUTO DIFF WBC: CPT

## 2021-07-27 RX ORDER — CHOLECALCIFEROL TAB 125 MCG (5000 UNIT) 125 MCG
5000 TAB ORAL DAILY
COMMUNITY

## 2021-07-27 RX ORDER — FOLIC ACID 1 MG/1
1 TABLET ORAL DAILY
COMMUNITY
End: 2022-03-14

## 2021-07-27 NOTE — ED NOTES
Bedside and Verbal shift change report given to [facility], located within [facility] by Anselmo. Report included the following information SBAR, ED Summary and Recent Results.

## 2021-07-27 NOTE — ED TRIAGE NOTES
Pt reports chest pressure off and on for about a week now, reports tingling sensation in her left arm and right leg, pt reports having muscle spasms in her lower back as well.
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
## 2021-08-06 ENCOUNTER — TELEPHONE (OUTPATIENT)
Dept: ORTHOPEDIC SURGERY | Age: 51
End: 2021-08-06

## 2021-08-06 ENCOUNTER — HOSPITAL ENCOUNTER (EMERGENCY)
Age: 51
Discharge: HOME | End: 2021-08-06
Attending: EMERGENCY MEDICINE
Payer: COMMERCIAL

## 2021-08-06 VITALS
HEART RATE: 96 BPM | DIASTOLIC BLOOD PRESSURE: 84 MMHG | RESPIRATION RATE: 18 BRPM | OXYGEN SATURATION: 98 % | SYSTOLIC BLOOD PRESSURE: 160 MMHG | TEMPERATURE: 98.4 F

## 2021-08-06 DIAGNOSIS — M79.605 PAIN IN BOTH LOWER EXTREMITIES: Primary | ICD-10-CM

## 2021-08-06 DIAGNOSIS — M79.604 PAIN IN BOTH LOWER EXTREMITIES: Primary | ICD-10-CM

## 2021-08-06 LAB
ANION GAP SERPL CALC-SCNC: 10 MMOL/L
BASOPHILS # BLD: 0 K/UL (ref 0–0.1)
BASOPHILS NFR BLD: 0 % (ref 0–2)
BUN SERPL-MCNC: 11 MG/DL (ref 9–21)
BUN/CREAT SERPL: 12
CA-I BLD-MCNC: 8.9 MG/DL (ref 8.5–10.5)
CHLORIDE SERPL-SCNC: 107 MMOL/L (ref 94–111)
CK SERPL-CCNC: 174 U/L (ref 26–140)
CO2 SERPL-SCNC: 24 MMOL/L (ref 21–33)
CREAT SERPL-MCNC: 0.9 MG/DL (ref 0.7–1.2)
D DIMER PPP FEU-MCNC: <0.27 UG/ML(FEU)
EOSINOPHIL # BLD: 0.1 K/UL (ref 0–0.4)
EOSINOPHIL NFR BLD: 1 % (ref 0–5)
ERYTHROCYTE [DISTWIDTH] IN BLOOD BY AUTOMATED COUNT: 13 % (ref 11.6–14.5)
GLUCOSE SERPL-MCNC: 149 MG/DL (ref 70–110)
HCT VFR BLD AUTO: 38 % (ref 35–45)
HGB BLD-MCNC: 11.9 G/DL (ref 12–16)
IMM GRANULOCYTES # BLD AUTO: 0 K/UL
IMM GRANULOCYTES NFR BLD AUTO: 0 %
LYMPHOCYTES # BLD: 2.7 K/UL (ref 0.9–3.6)
LYMPHOCYTES NFR BLD: 32 % (ref 21–52)
MAGNESIUM SERPL-MCNC: 2.1 MG/DL (ref 1.7–2.8)
MCH RBC QN AUTO: 29 PG (ref 24–34)
MCHC RBC AUTO-ENTMCNC: 31.3 G/DL (ref 31–37)
MCV RBC AUTO: 92.7 FL (ref 74–97)
MONOCYTES # BLD: 0.5 K/UL (ref 0.05–1.2)
MONOCYTES NFR BLD: 6 % (ref 3–10)
NEUTS SEG # BLD: 5.1 K/UL (ref 1.8–8)
NEUTS SEG NFR BLD: 61 % (ref 40–73)
PLATELET # BLD AUTO: 236 K/UL (ref 135–420)
PMV BLD AUTO: 9.6 FL (ref 9.2–11.8)
POTASSIUM SERPL-SCNC: 3.8 MMOL/L (ref 3.2–5.1)
RBC # BLD AUTO: 4.1 M/UL (ref 4.2–5.3)
SODIUM SERPL-SCNC: 141 MMOL/L (ref 135–145)
TROPONIN I SERPL-MCNC: <0.02 NG/ML (ref 0.02–0.05)
WBC # BLD AUTO: 8.4 K/UL (ref 4.6–13.2)

## 2021-08-06 PROCEDURE — 85379 FIBRIN DEGRADATION QUANT: CPT

## 2021-08-06 PROCEDURE — 85025 COMPLETE CBC W/AUTO DIFF WBC: CPT

## 2021-08-06 PROCEDURE — 74011250637 HC RX REV CODE- 250/637: Performed by: EMERGENCY MEDICINE

## 2021-08-06 PROCEDURE — 84484 ASSAY OF TROPONIN QUANT: CPT

## 2021-08-06 PROCEDURE — 96376 TX/PRO/DX INJ SAME DRUG ADON: CPT

## 2021-08-06 PROCEDURE — 83735 ASSAY OF MAGNESIUM: CPT

## 2021-08-06 PROCEDURE — 80048 BASIC METABOLIC PNL TOTAL CA: CPT

## 2021-08-06 PROCEDURE — 96374 THER/PROPH/DIAG INJ IV PUSH: CPT

## 2021-08-06 PROCEDURE — 74011250636 HC RX REV CODE- 250/636: Performed by: EMERGENCY MEDICINE

## 2021-08-06 PROCEDURE — 96375 TX/PRO/DX INJ NEW DRUG ADDON: CPT

## 2021-08-06 PROCEDURE — 82550 ASSAY OF CK (CPK): CPT

## 2021-08-06 PROCEDURE — 99283 EMERGENCY DEPT VISIT LOW MDM: CPT

## 2021-08-06 RX ORDER — CYCLOBENZAPRINE HCL 10 MG
10 TABLET ORAL
COMMUNITY
End: 2022-03-14

## 2021-08-06 RX ORDER — DIAZEPAM 10 MG/2ML
2.5 INJECTION INTRAMUSCULAR ONCE
Status: DISCONTINUED | OUTPATIENT
Start: 2021-08-06 | End: 2021-08-06

## 2021-08-06 RX ORDER — KETOROLAC TROMETHAMINE 30 MG/ML
15 INJECTION, SOLUTION INTRAMUSCULAR; INTRAVENOUS
Status: COMPLETED | OUTPATIENT
Start: 2021-08-06 | End: 2021-08-06

## 2021-08-06 RX ORDER — DIAZEPAM 5 MG/1
5 TABLET ORAL
Qty: 12 TABLET | Refills: 0 | Status: SHIPPED | OUTPATIENT
Start: 2021-08-06 | End: 2022-01-16

## 2021-08-06 RX ORDER — ONDANSETRON 2 MG/ML
4 INJECTION INTRAMUSCULAR; INTRAVENOUS
Status: COMPLETED | OUTPATIENT
Start: 2021-08-06 | End: 2021-08-06

## 2021-08-06 RX ORDER — KETOROLAC TROMETHAMINE 15 MG/ML
15 INJECTION, SOLUTION INTRAMUSCULAR; INTRAVENOUS
Status: DISCONTINUED | OUTPATIENT
Start: 2021-08-06 | End: 2021-08-06

## 2021-08-06 RX ORDER — DIAZEPAM 5 MG/1
5 TABLET ORAL
Status: COMPLETED | OUTPATIENT
Start: 2021-08-06 | End: 2021-08-06

## 2021-08-06 RX ORDER — SODIUM CHLORIDE 9 MG/ML
1000 INJECTION, SOLUTION INTRAVENOUS ONCE
Status: COMPLETED | OUTPATIENT
Start: 2021-08-06 | End: 2021-08-06

## 2021-08-06 RX ADMIN — ONDANSETRON HYDROCHLORIDE 4 MG: 2 INJECTION, SOLUTION INTRAMUSCULAR; INTRAVENOUS at 06:28

## 2021-08-06 RX ADMIN — DIAZEPAM 5 MG: 5 TABLET ORAL at 05:38

## 2021-08-06 RX ADMIN — KETOROLAC TROMETHAMINE 15 MG: 30 INJECTION, SOLUTION INTRAMUSCULAR; INTRAVENOUS at 05:38

## 2021-08-06 RX ADMIN — DIAZEPAM 5 MG: 5 TABLET ORAL at 06:47

## 2021-08-06 RX ADMIN — ONDANSETRON 4 MG: 2 INJECTION INTRAMUSCULAR; INTRAVENOUS at 05:39

## 2021-08-06 RX ADMIN — SODIUM CHLORIDE 1000 ML: 9 INJECTION, SOLUTION INTRAVENOUS at 05:38

## 2021-08-06 NOTE — LETTER
Gisela Haines was seen and treated in our emergency department on 8/6/2021. She may return to work on 08/07/2021    If you have any questions or concerns, please don't hesitate to call.

## 2021-08-06 NOTE — ED TRIAGE NOTES
Patient states that for the past 2 weeks she has been having muscle spasms in bilateral legs and back on and off. Patient states this morning her left leg started tightening up and she fell.
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
## 2021-08-09 DIAGNOSIS — M25.562 LEFT KNEE PAIN, UNSPECIFIED CHRONICITY: Primary | ICD-10-CM

## 2021-08-22 ENCOUNTER — HOSPITAL ENCOUNTER (EMERGENCY)
Age: 51
Discharge: HOME | End: 2021-08-22
Attending: FAMILY MEDICINE
Payer: COMMERCIAL

## 2021-08-22 ENCOUNTER — APPOINTMENT (OUTPATIENT)
Dept: GENERAL RADIOLOGY | Age: 51
End: 2021-08-22
Attending: FAMILY MEDICINE
Payer: COMMERCIAL

## 2021-08-22 VITALS
SYSTOLIC BLOOD PRESSURE: 148 MMHG | TEMPERATURE: 98.4 F | RESPIRATION RATE: 20 BRPM | WEIGHT: 165 LBS | HEIGHT: 64 IN | BODY MASS INDEX: 28.17 KG/M2 | HEART RATE: 86 BPM | DIASTOLIC BLOOD PRESSURE: 86 MMHG | OXYGEN SATURATION: 98 %

## 2021-08-22 DIAGNOSIS — J06.9 UPPER RESPIRATORY TRACT INFECTION, UNSPECIFIED TYPE: Primary | ICD-10-CM

## 2021-08-22 PROCEDURE — 99283 EMERGENCY DEPT VISIT LOW MDM: CPT

## 2021-08-22 PROCEDURE — 71045 X-RAY EXAM CHEST 1 VIEW: CPT

## 2021-08-22 RX ORDER — PREDNISONE 20 MG/1
40 TABLET ORAL DAILY
Qty: 10 TABLET | Refills: 0 | Status: SHIPPED | OUTPATIENT
Start: 2021-08-22 | End: 2021-08-27

## 2021-08-22 RX ORDER — MELOXICAM 15 MG/1
15 TABLET ORAL DAILY
COMMUNITY
End: 2022-03-14

## 2021-08-22 RX ORDER — BACLOFEN 10 MG/1
TABLET ORAL 3 TIMES DAILY
COMMUNITY
End: 2022-03-14

## 2021-08-22 NOTE — DISCHARGE INSTRUCTIONS
Thank you! Thank you for allowing me to care for you in the emergency department. I sincerely hope that you are satisfied with your visit today. It is my goal to provide you with excellent care. Below you will find a list of your labs and imaging from your visit today. Should you have any questions regarding these results please do not hesitate to call the emergency department. Labs -   No results found for this or any previous visit (from the past 12 hour(s)). Radiologic Studies -   XR CHEST PORT    (Results Pending)     CT Results  (Last 48 hours)      None          CXR Results  (Last 48 hours)      None               If you feel that you have not received excellent quality care or timely care, please ask to speak to the nurse manager. Please choose us in the future for your continued health care needs. ------------------------------------------------------------------------------------------------------------  The exam and treatment you received in the Emergency Department were for an urgent problem and are not intended as complete care. It is important that you follow-up with a doctor, nurse practitioner, or physician assistant to:  (1) confirm your diagnosis,  (2) re-evaluation of changes in your illness and treatment, and  (3) for ongoing care. If your symptoms become worse or you do not improve as expected and you are unable to reach your usual health care provider, you should return to the Emergency Department. We are available 24 hours a day. Please take your discharge instructions with you when you go to your follow-up appointment. If you have any problem arranging a follow-up appointment, contact the Emergency Department immediately. If a prescription has been provided, please have it filled as soon as possible to prevent a delay in treatment. Read the entire medication instruction sheet provided to you by the pharmacy.  If you have any questions or reservations about taking the medication due to side effects or interactions with other medications, please call your primary care physician or contact the ER to speak with the charge nurse. Make an appointment with your family doctor or the physician you were referred to for follow-up of this visit as instructed on your discharge paperwork, as this is a mandatory follow-up. Return to the ER if you are unable to be seen or if you are unable to be seen in a timely manner. If you have any problem arranging the follow-up visit, contact the Emergency Department immediately.

## 2021-08-22 NOTE — ED TRIAGE NOTES
Pt states she started with a painful cough and chest congestion approx a week ago. Pt has been taking mucinex but is unable to break it up. Pt denies fevers.   Pt states she has had her COVID vaccine
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
## 2021-08-22 NOTE — LETTER
[facility] EMERGENCY DEPT  [address] [address]  [phone]    Work/School Note    Date: 8/22/2021    To Whom It May concern:    Vangie Hernandez was seen and treated today in the emergency room by the following provider(s):  Attending Provider: Ivette Flaherty DO. Vangie Hernandez is excused from work/school on 08/22/21 and 08/23/21. She is medically clear to return to work/school on 8/24/2021.        Sincerely,          Lemuel Parham DO

## 2021-12-29 ENCOUNTER — TRANSCRIBE ORDER (OUTPATIENT)
Dept: SCHEDULING | Age: 51
End: 2021-12-29

## 2021-12-29 DIAGNOSIS — M25.541 PAIN IN THUMB JOINT WITH MOVEMENT, RIGHT: Primary | ICD-10-CM

## 2022-01-05 ENCOUNTER — HOSPITAL ENCOUNTER (OUTPATIENT)
Dept: MRI IMAGING | Age: 52
Discharge: HOME | End: 2022-01-05
Payer: OTHER MISCELLANEOUS

## 2022-01-05 DIAGNOSIS — M25.541 PAIN IN THUMB JOINT WITH MOVEMENT, RIGHT: ICD-10-CM

## 2022-01-05 PROCEDURE — 73221 MRI JOINT UPR EXTREM W/O DYE: CPT

## 2022-01-16 ENCOUNTER — HOSPITAL ENCOUNTER (EMERGENCY)
Age: 52
Discharge: HOME | End: 2022-01-16
Attending: INTERNAL MEDICINE
Payer: COMMERCIAL

## 2022-01-16 VITALS
BODY MASS INDEX: 26.8 KG/M2 | RESPIRATION RATE: 17 BRPM | OXYGEN SATURATION: 98 % | SYSTOLIC BLOOD PRESSURE: 154 MMHG | DIASTOLIC BLOOD PRESSURE: 107 MMHG | HEIGHT: 64 IN | WEIGHT: 157 LBS | TEMPERATURE: 98.3 F | HEART RATE: 84 BPM

## 2022-01-16 DIAGNOSIS — B34.9 VIRAL SYNDROME: Primary | ICD-10-CM

## 2022-01-16 LAB
FLUAV AG NPH QL IA: NEGATIVE
FLUBV AG NOSE QL IA: NEGATIVE

## 2022-01-16 PROCEDURE — 87804 INFLUENZA ASSAY W/OPTIC: CPT

## 2022-01-16 PROCEDURE — 74011250637 HC RX REV CODE- 250/637: Performed by: INTERNAL MEDICINE

## 2022-01-16 PROCEDURE — 99283 EMERGENCY DEPT VISIT LOW MDM: CPT

## 2022-01-16 RX ORDER — ONDANSETRON 4 MG/1
4 TABLET, ORALLY DISINTEGRATING ORAL
Status: COMPLETED | OUTPATIENT
Start: 2022-01-16 | End: 2022-01-16

## 2022-01-16 RX ORDER — ACETAMINOPHEN 500 MG
1000 TABLET ORAL
Status: COMPLETED | OUTPATIENT
Start: 2022-01-16 | End: 2022-01-16

## 2022-01-16 RX ADMIN — ACETAMINOPHEN 1000 MG: 500 TABLET ORAL at 17:22

## 2022-01-16 RX ADMIN — ONDANSETRON 4 MG: 4 TABLET, ORALLY DISINTEGRATING ORAL at 17:47
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
## 2022-01-28 ENCOUNTER — HOSPITAL ENCOUNTER (EMERGENCY)
Age: 52
Discharge: HOME | End: 2022-01-28
Attending: INTERNAL MEDICINE
Payer: COMMERCIAL

## 2022-01-28 VITALS
WEIGHT: 157 LBS | OXYGEN SATURATION: 100 % | TEMPERATURE: 98.2 F | RESPIRATION RATE: 18 BRPM | BODY MASS INDEX: 26.8 KG/M2 | HEIGHT: 64 IN | DIASTOLIC BLOOD PRESSURE: 85 MMHG | HEART RATE: 71 BPM | SYSTOLIC BLOOD PRESSURE: 149 MMHG

## 2022-01-28 DIAGNOSIS — U07.1 COVID-19 VIRUS INFECTION: Primary | ICD-10-CM

## 2022-01-28 LAB
ALBUMIN SERPL-MCNC: 4.4 G/DL (ref 3.5–4.7)
ALBUMIN/GLOB SERPL: 1.3 {RATIO}
ALP SERPL-CCNC: 63 U/L (ref 38–126)
ALT SERPL-CCNC: 17 U/L (ref 3–52)
ANION GAP SERPL CALC-SCNC: 6 MMOL/L
AST SERPL W P-5'-P-CCNC: 19 U/L (ref 14–74)
BASOPHILS # BLD: 0 K/UL (ref 0–0.1)
BASOPHILS NFR BLD: 1 % (ref 0–2)
BILIRUB SERPL-MCNC: 0.3 MG/DL (ref 0.2–1)
BUN SERPL-MCNC: 10 MG/DL (ref 9–21)
BUN/CREAT SERPL: 13
CA-I BLD-MCNC: 9.7 MG/DL (ref 8.5–10.5)
CHLORIDE SERPL-SCNC: 107 MMOL/L (ref 94–111)
CO2 SERPL-SCNC: 26 MMOL/L (ref 21–33)
CREAT SERPL-MCNC: 0.8 MG/DL (ref 0.7–1.2)
DIFFERENTIAL METHOD BLD: ABNORMAL
EOSINOPHIL # BLD: 0 K/UL (ref 0–0.4)
EOSINOPHIL NFR BLD: 1 % (ref 0–5)
ERYTHROCYTE [DISTWIDTH] IN BLOOD BY AUTOMATED COUNT: 12 % (ref 11.6–14.5)
GLOBULIN SER CALC-MCNC: 3.4 G/DL
GLUCOSE SERPL-MCNC: 100 MG/DL (ref 70–110)
HCT VFR BLD AUTO: 39.5 % (ref 35–45)
HGB BLD-MCNC: 13 G/DL (ref 12–16)
IMM GRANULOCYTES # BLD AUTO: 0 K/UL (ref 0–0.04)
IMM GRANULOCYTES NFR BLD AUTO: 1 % (ref 0–0.5)
LYMPHOCYTES # BLD: 2.3 K/UL (ref 0.9–3.6)
LYMPHOCYTES NFR BLD: 39 % (ref 21–52)
MCH RBC QN AUTO: 30 PG (ref 24–34)
MCHC RBC AUTO-ENTMCNC: 32.9 G/DL (ref 31–37)
MCV RBC AUTO: 91.2 FL (ref 78–100)
MONOCYTES # BLD: 0.2 K/UL (ref 0.05–1.2)
MONOCYTES NFR BLD: 4 % (ref 3–10)
NEUTS SEG # BLD: 3.3 K/UL (ref 1.8–8)
NEUTS SEG NFR BLD: 54 % (ref 40–73)
NRBC # BLD: 0 K/UL (ref 0–0.01)
NRBC BLD-RTO: 0 PER 100 WBC
PLATELET # BLD AUTO: 264 K/UL (ref 135–420)
PMV BLD AUTO: 9.2 FL (ref 9.2–11.8)
POTASSIUM SERPL-SCNC: 3.7 MMOL/L (ref 3.2–5.1)
PROT SERPL-MCNC: 7.8 G/DL (ref 6.1–8.4)
RBC # BLD AUTO: 4.33 M/UL (ref 4.2–5.3)
SODIUM SERPL-SCNC: 139 MMOL/L (ref 135–145)
WBC # BLD AUTO: 5.9 K/UL (ref 4.6–13.2)

## 2022-01-28 PROCEDURE — 99282 EMERGENCY DEPT VISIT SF MDM: CPT

## 2022-01-28 PROCEDURE — 80053 COMPREHEN METABOLIC PANEL: CPT

## 2022-01-28 PROCEDURE — 74011250636 HC RX REV CODE- 250/636: Performed by: INTERNAL MEDICINE

## 2022-01-28 PROCEDURE — 85025 COMPLETE CBC W/AUTO DIFF WBC: CPT

## 2022-01-28 PROCEDURE — 96374 THER/PROPH/DIAG INJ IV PUSH: CPT

## 2022-01-28 RX ORDER — LOSARTAN POTASSIUM 50 MG/1
50 TABLET ORAL DAILY
COMMUNITY

## 2022-01-28 RX ORDER — KETOROLAC TROMETHAMINE 30 MG/ML
30 INJECTION, SOLUTION INTRAMUSCULAR; INTRAVENOUS
Status: COMPLETED | OUTPATIENT
Start: 2022-01-28 | End: 2022-01-28

## 2022-01-28 RX ORDER — DOXYCYCLINE 100 MG/1
100 CAPSULE ORAL 2 TIMES DAILY
COMMUNITY
End: 2022-03-14

## 2022-01-28 RX ADMIN — KETOROLAC TROMETHAMINE 30 MG: 30 INJECTION, SOLUTION INTRAMUSCULAR at 14:04

## 2022-01-28 RX ADMIN — SODIUM CHLORIDE 1000 ML: 9 INJECTION, SOLUTION INTRAVENOUS at 14:24
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
## 2022-01-28 NOTE — ED TRIAGE NOTES
Pt took a home covid test 5 days ago and is here because she doesn't feel good. State that she has been taking doxycycline, tylenol and ibuprofen.

## 2022-01-28 NOTE — Clinical Note
[facility] EMERGENCY DEPT  [address] [address]  [phone]    Work/School Note    Date: 1/28/2022    To Whom It May concern:    Eleazar Chisholm was seen and treated today in the emergency room by the following provider(s):  Attending Provider: Edilma French MD.      Eleazar Chisholm is excused from work/school on 1/28/2022 through 1/30/2022. She is medically clear to return to work/school on 1/31/2022.          Sincerely,          Neftali Agarwal MD

## 2022-01-31 ENCOUNTER — PATIENT OUTREACH (OUTPATIENT)
Dept: CASE MANAGEMENT | Age: 52
End: 2022-01-31

## 2022-01-31 NOTE — PROGRESS NOTES
Ambulatory Care Coordination ED COVID Follow up Call    Challenges to be reviewed by the provider   Additional needs identified to be addressed with provider no  none           Encounter was not routed to provider for escalation. Method of communication with provider : none    Discussed COVID-19 related testing which was available at this time. Test results were positive. Patient informed of results, if available? Patient was tested at PCP office on 1/20/22. Current Symptoms: fatigue, cough and congestion    Reviewed New or Changed Meds: yes    Do you have what you need at home?  Durable Medical Equipment ordered at discharge: None   Home Health/Outpatient orders at discharge: none    Pulse oximeter? no Discussed and confirmed pulse oximeter discharge instructions and when to notify provider or seek emergency care. Patient education provided: Reviewed appropriate site of care based on symptoms and resources available to patient including: PCP, Urgent Care Clinics and When to call 911. Follow up appointment recommended: yes. If no appointment scheduled, scheduling offered: no. Patient states she will contact PCP office to schedule follow up appointment  No future appointments. Interventions: Obtained and reviewed discharge summary and/or continuity of care documents  Reviewed discharge instructions, medical action plan and red flags with patient who verbalized understanding. Provided contact information for future needs. Plan for follow-up call in 5-7 days based on severity of symptoms and risk factors.   Plan for next call: symptom management-- and continuity of care     Mare Solitario RN

## 2022-02-07 ENCOUNTER — PATIENT OUTREACH (OUTPATIENT)
Dept: CASE MANAGEMENT | Age: 52
End: 2022-02-07

## 2022-02-07 NOTE — PROGRESS NOTES
Date/Time:  2/7/2022 12:15 PM   Call within 2 business days of discharge: NA   Attempted to reach patient by telephone. Left HIPPA compliant message requesting a return call. Will attempt to reach patient again.

## 2022-02-14 ENCOUNTER — PATIENT OUTREACH (OUTPATIENT)
Dept: CASE MANAGEMENT | Age: 52
End: 2022-02-14

## 2022-02-14 NOTE — PROGRESS NOTES
Patient resolved from [address] Transitions episode on 2/14/22. Discussed COVID-19 related testing which was available at this time. Test results were positive. Patient informed of results, if available? Patient was tested in PCP office on 1/20/22     Patient/family has been provided the following resources and education related to COVID-19:                         Signs, symptoms and red flags related to COVID-19            CDC exposure and quarantine guidelines            [facility] exposure contact - [phone]            Contact for their local Department of Health                 Patient currently reports that the following symptoms have improved:  cough. No further outreach scheduled with this CTN/ACM/LPN/HC/ MA. Episode of Care resolved. Patient has this CTN/ACM/LPN/HC/MA contact information if future needs arise.

## 2022-03-14 ENCOUNTER — HOSPITAL ENCOUNTER (EMERGENCY)
Age: 52
Discharge: HOME | End: 2022-03-14
Attending: INTERNAL MEDICINE
Payer: COMMERCIAL

## 2022-03-14 VITALS
DIASTOLIC BLOOD PRESSURE: 80 MMHG | WEIGHT: 160 LBS | HEART RATE: 85 BPM | OXYGEN SATURATION: 98 % | TEMPERATURE: 98.4 F | BODY MASS INDEX: 27.31 KG/M2 | RESPIRATION RATE: 16 BRPM | SYSTOLIC BLOOD PRESSURE: 149 MMHG | HEIGHT: 64 IN

## 2022-03-14 DIAGNOSIS — J06.9 UPPER RESPIRATORY TRACT INFECTION, UNSPECIFIED TYPE: Primary | ICD-10-CM

## 2022-03-14 PROCEDURE — 87880 STREP A ASSAY W/OPTIC: CPT

## 2022-03-14 PROCEDURE — 87804 INFLUENZA ASSAY W/OPTIC: CPT

## 2022-03-14 PROCEDURE — 99283 EMERGENCY DEPT VISIT LOW MDM: CPT

## 2022-03-14 PROCEDURE — 87070 CULTURE OTHR SPECIMN AEROBIC: CPT

## 2022-03-14 NOTE — ED NOTES
Verbal shift change report given to Cheli Lowe LPN (oncoming nurse) by Cortney Lee RN (offgoing nurse). Report included the following information SBAR and ED Summary.
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
## 2022-03-15 ENCOUNTER — PATIENT OUTREACH (OUTPATIENT)
Dept: CASE MANAGEMENT | Age: 52
End: 2022-03-15

## 2022-03-15 NOTE — PROGRESS NOTES
Date/Time:  3/15/2022 10:00 AM   Call within 2 business days of discharge: Yes   Attempted to reach patient by telephone. Spoke with patient. She asked if she could be called back a little bit later. She states she would like to discuss her visit. Patient has LPN CC contact information. Patient advised to return call when she is available to speak. Patient verbalizes understanding.

## 2022-03-16 LAB
BACTERIA SPEC CULT: NORMAL
SPECIAL REQUESTS,SREQ: NORMAL

## 2022-03-17 ENCOUNTER — PATIENT OUTREACH (OUTPATIENT)
Dept: CASE MANAGEMENT | Age: 52
End: 2022-03-17

## 2022-03-17 NOTE — PROGRESS NOTES
Date/Time:  3/17/2022 10:31 AM   Call within 2 business days of discharge: Yes   2nd attempt to reach patient by telephone. Unable to leave HIPPA compliant message requesting a return call. (Mailbox is full)This episode is resolved.

## 2022-03-18 PROBLEM — K21.9 GASTROESOPHAGEAL REFLUX DISEASE WITHOUT ESOPHAGITIS: Status: ACTIVE | Noted: 2021-07-27

## 2022-03-19 PROBLEM — R20.2 PARESTHESIA: Status: ACTIVE | Noted: 2021-07-27

## 2022-07-21 ENCOUNTER — HOSPITAL ENCOUNTER (EMERGENCY)
Age: 52
Discharge: HOME | End: 2022-07-21
Attending: INTERNAL MEDICINE
Payer: COMMERCIAL

## 2022-07-21 ENCOUNTER — APPOINTMENT (OUTPATIENT)
Dept: GENERAL RADIOLOGY | Age: 52
End: 2022-07-21
Attending: INTERNAL MEDICINE
Payer: COMMERCIAL

## 2022-07-21 VITALS
TEMPERATURE: 97.7 F | BODY MASS INDEX: 27.66 KG/M2 | WEIGHT: 162 LBS | RESPIRATION RATE: 18 BRPM | DIASTOLIC BLOOD PRESSURE: 80 MMHG | HEART RATE: 65 BPM | HEIGHT: 64 IN | SYSTOLIC BLOOD PRESSURE: 139 MMHG | OXYGEN SATURATION: 99 %

## 2022-07-21 DIAGNOSIS — J06.9 UPPER RESPIRATORY TRACT INFECTION, UNSPECIFIED TYPE: Primary | ICD-10-CM

## 2022-07-21 LAB
ALBUMIN SERPL-MCNC: 3.8 G/DL (ref 3.4–5)
ALBUMIN/GLOB SERPL: 1 {RATIO} (ref 0.8–1.7)
ALP SERPL-CCNC: 94 U/L (ref 45–117)
ALT SERPL-CCNC: 21 U/L (ref 13–56)
ANION GAP SERPL CALC-SCNC: 6 MMOL/L (ref 3–18)
APPEARANCE UR: CLEAR
ARTERIAL PATENCY WRIST A: YES
ARTERIAL PATENCY WRIST A: YES
AST SERPL W P-5'-P-CCNC: 24 U/L (ref 10–38)
ATRIAL RATE: 77 BPM
BACTERIA URNS QL MICRO: ABNORMAL /HPF
BASE EXCESS BLDA CALC-SCNC: 12.2 MMOL/L (ref 0–3)
BASE EXCESS BLDA CALC-SCNC: 7.6 MMOL/L (ref 0–3)
BASOPHILS # BLD: 0 K/UL (ref 0–0.1)
BASOPHILS NFR BLD: 0 % (ref 0–2)
BDY SITE: ABNORMAL
BDY SITE: ABNORMAL
BILIRUB DIRECT SERPL-MCNC: <0.1 MG/DL (ref 0–0.2)
BILIRUB SERPL-MCNC: 0.4 MG/DL (ref 0.2–1)
BILIRUB UR QL: NEGATIVE
BUN SERPL-MCNC: 7 MG/DL (ref 7–18)
BUN/CREAT SERPL: 7 (ref 12–20)
CA-I BLD-MCNC: 9.1 MG/DL (ref 8.5–10.1)
CALCULATED P AXIS, ECG09: 16 DEGREES
CALCULATED R AXIS, ECG10: 22 DEGREES
CALCULATED T AXIS, ECG11: 32 DEGREES
CHLORIDE SERPL-SCNC: 107 MMOL/L (ref 100–111)
CO2 SERPL-SCNC: 26 MMOL/L (ref 21–32)
COHGB MFR BLD: 1 % (ref 1–2)
COHGB MFR BLD: 1.2 % (ref 1–2)
COLOR UR: YELLOW
CREAT SERPL-MCNC: 0.94 MG/DL (ref 0.6–1.3)
D DIMER PPP FEU-MCNC: <0.27 UG/ML(FEU)
DIAGNOSIS, 93000: NORMAL
DIFFERENTIAL METHOD BLD: NORMAL
EOSINOPHIL # BLD: 0.1 K/UL (ref 0–0.4)
EOSINOPHIL NFR BLD: 1 % (ref 0–5)
EPITH CASTS URNS QL MICRO: ABNORMAL /LPF (ref 0–20)
ERYTHROCYTE [DISTWIDTH] IN BLOOD BY AUTOMATED COUNT: 12.3 % (ref 11.6–14.5)
FLUAV AG NPH QL IA: NEGATIVE
FLUBV AG NOSE QL IA: NEGATIVE
GLOBULIN SER CALC-MCNC: 3.8 G/DL (ref 2–4)
GLUCOSE SERPL-MCNC: 109 MG/DL (ref 74–99)
GLUCOSE UR STRIP.AUTO-MCNC: NEGATIVE MG/DL
HCO3 BLDA-SCNC: 35 MMOL/L (ref 22–26)
HCO3 BLDA-SCNC: 40 MMOL/L (ref 22–26)
HCT VFR BLD AUTO: 39.5 % (ref 35–45)
HGB BLD-MCNC: 12.8 G/DL (ref 12–16)
HGB UR QL STRIP: NEGATIVE
IMM GRANULOCYTES # BLD AUTO: 0 K/UL (ref 0–0.04)
IMM GRANULOCYTES NFR BLD AUTO: 0 % (ref 0–0.5)
KETONES UR QL STRIP.AUTO: NEGATIVE MG/DL
LEUKOCYTE ESTERASE UR QL STRIP.AUTO: ABNORMAL
LYMPHOCYTES # BLD: 2.2 K/UL (ref 0.9–3.6)
LYMPHOCYTES NFR BLD: 32 % (ref 21–52)
MCH RBC QN AUTO: 29 PG (ref 24–34)
MCHC RBC AUTO-ENTMCNC: 32.4 G/DL (ref 31–37)
MCV RBC AUTO: 89.4 FL (ref 78–100)
METHGB MFR BLD: 0.3 % (ref 0–1.4)
METHGB MFR BLD: 0.3 % (ref 0–1.4)
MONOCYTES # BLD: 0.3 K/UL (ref 0.05–1.2)
MONOCYTES NFR BLD: 5 % (ref 3–10)
NEUTS SEG # BLD: 4.3 K/UL (ref 1.8–8)
NEUTS SEG NFR BLD: 62 % (ref 40–73)
NITRITE UR QL STRIP.AUTO: NEGATIVE
NRBC # BLD: 0 K/UL (ref 0–0.01)
NRBC BLD-RTO: 0 PER 100 WBC
OXYHGB MFR BLD: 78.1 % (ref 95–99)
OXYHGB MFR BLD: 79.7 % (ref 95–99)
P-R INTERVAL, ECG05: 171 MS
PCO2 BLDA: 62 MMHG (ref 35–45)
PCO2 BLDA: 69 MMHG (ref 35–45)
PERFORMED BY, TECHID: ABNORMAL
PERFORMED BY, TECHID: ABNORMAL
PH BLDA: 7.37 [PH] (ref 7.35–7.45)
PH BLDA: 7.38 [PH] (ref 7.35–7.45)
PH UR STRIP: 8.5 [PH] (ref 5–8)
PLATELET # BLD AUTO: 227 K/UL (ref 135–420)
PMV BLD AUTO: 10.2 FL (ref 9.2–11.8)
PO2 BLDA: 43 MMHG (ref 80–100)
PO2 BLDA: 48 MMHG (ref 80–100)
POTASSIUM SERPL-SCNC: 4.1 MMOL/L (ref 3.5–5.5)
PROT SERPL-MCNC: 7.6 G/DL (ref 6.4–8.2)
PROT UR STRIP-MCNC: NEGATIVE MG/DL
Q-T INTERVAL, ECG07: 403 MS
QRS DURATION, ECG06: 84 MS
QTC CALCULATION (BEZET), ECG08: 454 MS
RBC # BLD AUTO: 4.42 M/UL (ref 4.2–5.3)
RBC #/AREA URNS HPF: ABNORMAL /HPF (ref 0–2)
SAO2 % BLD: 79 % (ref 95–99)
SAO2 % BLD: 81 % (ref 95–99)
SAO2% DEVICE SAO2% SENSOR NAME: ABNORMAL
SAO2% DEVICE SAO2% SENSOR NAME: ABNORMAL
SERVICE CMNT-IMP: ABNORMAL
SERVICE CMNT-IMP: ABNORMAL
SODIUM SERPL-SCNC: 139 MMOL/L (ref 136–145)
SP GR UR REFRACTOMETRY: 1.02 (ref 1–1.03)
SPECIMEN SITE: ABNORMAL
SPECIMEN SITE: ABNORMAL
TROPONIN-HIGH SENSITIVITY: 3 NG/L (ref 0–54)
UROBILINOGEN UR QL STRIP.AUTO: 1 EU/DL (ref 0.2–1)
VENTRICULAR RATE, ECG03: 76 BPM
WBC # BLD AUTO: 7 K/UL (ref 4.6–13.2)
WBC URNS QL MICRO: ABNORMAL /HPF (ref 0–4)

## 2022-07-21 PROCEDURE — 81001 URINALYSIS AUTO W/SCOPE: CPT

## 2022-07-21 PROCEDURE — 80076 HEPATIC FUNCTION PANEL: CPT

## 2022-07-21 PROCEDURE — 36600 WITHDRAWAL OF ARTERIAL BLOOD: CPT

## 2022-07-21 PROCEDURE — 84484 ASSAY OF TROPONIN QUANT: CPT

## 2022-07-21 PROCEDURE — 99285 EMERGENCY DEPT VISIT HI MDM: CPT

## 2022-07-21 PROCEDURE — 93005 ELECTROCARDIOGRAM TRACING: CPT

## 2022-07-21 PROCEDURE — 85379 FIBRIN DEGRADATION QUANT: CPT

## 2022-07-21 PROCEDURE — 71045 X-RAY EXAM CHEST 1 VIEW: CPT

## 2022-07-21 PROCEDURE — 87804 INFLUENZA ASSAY W/OPTIC: CPT

## 2022-07-21 PROCEDURE — 85025 COMPLETE CBC W/AUTO DIFF WBC: CPT

## 2022-07-21 PROCEDURE — 80048 BASIC METABOLIC PNL TOTAL CA: CPT

## 2022-07-21 PROCEDURE — 82803 BLOOD GASES ANY COMBINATION: CPT

## 2022-07-21 RX ORDER — LORATADINE 10 MG/1
TABLET ORAL
COMMUNITY

## 2022-07-21 NOTE — ED TRIAGE NOTES
Cough with congestion  and change in taste and smell x 2 days - started to have pain with deep breath yesterday. Denies fever N/V/D.
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
## 2022-07-27 ENCOUNTER — OFFICE VISIT (OUTPATIENT)
Dept: GASTROENTEROLOGY | Age: 52
End: 2022-07-27
Payer: COMMERCIAL

## 2022-07-27 VITALS
HEART RATE: 70 BPM | SYSTOLIC BLOOD PRESSURE: 139 MMHG | DIASTOLIC BLOOD PRESSURE: 80 MMHG | BODY MASS INDEX: 29.87 KG/M2 | WEIGHT: 174 LBS

## 2022-07-27 DIAGNOSIS — Q07.00 ARNOLD-CHIARI SYNDROME (HCC): ICD-10-CM

## 2022-07-27 DIAGNOSIS — Z12.11 COLON CANCER SCREENING: ICD-10-CM

## 2022-07-27 DIAGNOSIS — N20.0 KIDNEY STONE: ICD-10-CM

## 2022-07-27 DIAGNOSIS — I10 PRIMARY HYPERTENSION: ICD-10-CM

## 2022-07-27 DIAGNOSIS — R10.13 EPIGASTRIC PAIN: Primary | ICD-10-CM

## 2022-07-27 DIAGNOSIS — Z86.69 HISTORY OF MIGRAINE HEADACHES: ICD-10-CM

## 2022-07-27 DIAGNOSIS — M79.18 MYOFASCIAL PAIN DYSFUNCTION SYNDROME: ICD-10-CM

## 2022-07-27 DIAGNOSIS — E11.65 TYPE 2 DIABETES MELLITUS WITH HYPERGLYCEMIA, UNSPECIFIED WHETHER LONG TERM INSULIN USE (HCC): ICD-10-CM

## 2022-07-27 PROCEDURE — 99204 OFFICE O/P NEW MOD 45 MIN: CPT | Performed by: INTERNAL MEDICINE

## 2022-07-27 NOTE — LETTER
7/27/2022    Patient: Danis Lynn   YOB: 1970   Date of Visit: 7/27/2022     Kristen Hastings MD  [address] [facility] [id number]  Via Fax: [phone]    Dear Kristen Hastings MD,      Thank you for referring Ms. Swetha Matt to [address] [place] [address] for evaluation. My notes for this consultation are attached. If you have questions, please do not hesitate to call me. I look forward to following your patient along with you.       Sincerely,    Anum Perdomo MD

## 2022-07-27 NOTE — PROGRESS NOTES
Chen Fierro presents today for   Chief Complaint   Patient presents with    New Patient     Patient is having some epigastric pain that is radiating under left breast to her upper back. Pain is really tender throughout that area and in her stomach       Is someone accompanying this pt? no    Is the patient using any DME equipment during OV? no    Depression Screening:  3 most recent PHQ Screens 7/27/2022   Little interest or pleasure in doing things Not at all   Feeling down, depressed, irritable, or hopeless Not at all   Total Score PHQ 2 0       Learning Assessment:  Learning Assessment 3/16/2021   PRIMARY LEARNER Patient   HIGHEST LEVEL OF EDUCATION - PRIMARY LEARNER  GRADUATED HIGH SCHOOL OR GED   PRIMARY LANGUAGE ENGLISH   LEARNER PREFERENCE PRIMARY LISTENING   LEARNING SPECIAL TOPICS NO   ANSWERED BY SELF   RELATIONSHIP SELF       Fall Risk  No flowsheet data found. Coordination of Care:  1. Have you been to the ER, urgent care clinic since your last visit? Hospitalized since your last visit? Yes sh for chest pain and tightness on 7/21    2. Have you seen or consulted any other health care providers outside of the [address] [place] [address] since your last visit? Include any pap smears or colon screening.  Yes, PCP [facility]

## 2022-08-17 ENCOUNTER — HOSPITAL ENCOUNTER (OUTPATIENT)
Dept: ULTRASOUND IMAGING | Age: 52
Discharge: HOME | End: 2022-08-17
Attending: INTERNAL MEDICINE
Payer: COMMERCIAL

## 2022-08-17 DIAGNOSIS — R10.13 EPIGASTRIC PAIN: ICD-10-CM

## 2022-08-17 PROCEDURE — 76705 ECHO EXAM OF ABDOMEN: CPT

## 2022-08-23 NOTE — PROGRESS NOTES
Ultrasound is normal.  She has a simple cyst in her right kidney. Nothing to explain symptoms. Please notify patient of result.

## 2022-09-27 ENCOUNTER — PREP FOR PROCEDURE (OUTPATIENT)
Dept: GASTROENTEROLOGY | Age: 52
End: 2022-09-27

## 2022-09-27 RX ORDER — SODIUM CHLORIDE, SODIUM LACTATE, POTASSIUM CHLORIDE, CALCIUM CHLORIDE 600; 310; 30; 20 MG/100ML; MG/100ML; MG/100ML; MG/100ML
75 INJECTION, SOLUTION INTRAVENOUS CONTINUOUS
Status: CANCELLED | OUTPATIENT
Start: 2022-09-27

## 2022-09-27 NOTE — H&P
Date of Surgery Update:  Shasta Knight was seen and examined. History and physical has been reviewed. The patient has been examined.  There have been no significant clinical changes since the completion of the originally dated History and Physical.    Signed By: Deven Bright MD     September 27, 2022 2:33 PM

## 2022-09-28 ENCOUNTER — ANESTHESIA EVENT (OUTPATIENT)
Dept: ENDOSCOPY | Age: 52
End: 2022-09-28
Payer: COMMERCIAL

## 2022-10-05 ENCOUNTER — ANESTHESIA (OUTPATIENT)
Dept: ENDOSCOPY | Age: 52
End: 2022-10-05
Payer: COMMERCIAL

## 2022-10-05 ENCOUNTER — HOSPITAL ENCOUNTER (OUTPATIENT)
Age: 52
Setting detail: OUTPATIENT SURGERY
Discharge: HOME | End: 2022-10-05
Attending: INTERNAL MEDICINE | Admitting: INTERNAL MEDICINE
Payer: COMMERCIAL

## 2022-10-05 VITALS
RESPIRATION RATE: 16 BRPM | SYSTOLIC BLOOD PRESSURE: 135 MMHG | BODY MASS INDEX: 27.46 KG/M2 | HEART RATE: 65 BPM | DIASTOLIC BLOOD PRESSURE: 74 MMHG | TEMPERATURE: 97.2 F | WEIGHT: 160 LBS | OXYGEN SATURATION: 98 %

## 2022-10-05 LAB
GLUCOSE BLD STRIP.AUTO-MCNC: 119 MG/DL (ref 70–110)
PERFORMED BY, TECHID: ABNORMAL

## 2022-10-05 PROCEDURE — 77030037186 HC VLV ENDOSC STRL DEFENDO DISP MVAT -A: Performed by: INTERNAL MEDICINE

## 2022-10-05 PROCEDURE — 77030042138 HC TBNG SPECIAL -A: Performed by: INTERNAL MEDICINE

## 2022-10-05 PROCEDURE — 74011250636 HC RX REV CODE- 250/636: Performed by: NURSE ANESTHETIST, CERTIFIED REGISTERED

## 2022-10-05 PROCEDURE — 82962 GLUCOSE BLOOD TEST: CPT

## 2022-10-05 PROCEDURE — 43235 EGD DIAGNOSTIC BRUSH WASH: CPT | Performed by: INTERNAL MEDICINE

## 2022-10-05 PROCEDURE — 76040000019: Performed by: INTERNAL MEDICINE

## 2022-10-05 PROCEDURE — 2709999900 HC NON-CHARGEABLE SUPPLY: Performed by: INTERNAL MEDICINE

## 2022-10-05 PROCEDURE — 77030018831 HC SOL IRR H20 BAXT -A: Performed by: INTERNAL MEDICINE

## 2022-10-05 PROCEDURE — 76060000031 HC ANESTHESIA FIRST 0.5 HR: Performed by: INTERNAL MEDICINE

## 2022-10-05 RX ORDER — PROPOFOL 10 MG/ML
INJECTION, EMULSION INTRAVENOUS AS NEEDED
Status: DISCONTINUED | OUTPATIENT
Start: 2022-10-05 | End: 2022-10-05 | Stop reason: HOSPADM

## 2022-10-05 RX ORDER — SODIUM CHLORIDE, SODIUM LACTATE, POTASSIUM CHLORIDE, CALCIUM CHLORIDE 600; 310; 30; 20 MG/100ML; MG/100ML; MG/100ML; MG/100ML
25 INJECTION, SOLUTION INTRAVENOUS CONTINUOUS
Status: DISCONTINUED | OUTPATIENT
Start: 2022-10-05 | End: 2022-10-05 | Stop reason: HOSPADM

## 2022-10-05 RX ORDER — SODIUM CHLORIDE 0.9 % (FLUSH) 0.9 %
5-40 SYRINGE (ML) INJECTION EVERY 8 HOURS
Status: DISCONTINUED | OUTPATIENT
Start: 2022-10-05 | End: 2022-10-05 | Stop reason: HOSPADM

## 2022-10-05 RX ORDER — SODIUM CHLORIDE 0.9 % (FLUSH) 0.9 %
5-40 SYRINGE (ML) INJECTION AS NEEDED
Status: DISCONTINUED | OUTPATIENT
Start: 2022-10-05 | End: 2022-10-05 | Stop reason: HOSPADM

## 2022-10-05 RX ADMIN — PROPOFOL 100 MG: 10 INJECTION, EMULSION INTRAVENOUS at 13:37

## 2022-10-05 RX ADMIN — SODIUM CHLORIDE, POTASSIUM CHLORIDE, SODIUM LACTATE AND CALCIUM CHLORIDE 25 ML/HR: 600; 310; 30; 20 INJECTION, SOLUTION INTRAVENOUS at 12:38

## 2022-10-05 RX ADMIN — PROPOFOL 50 MG: 10 INJECTION, EMULSION INTRAVENOUS at 13:38

## 2022-10-05 RX ADMIN — PROPOFOL 100 MG: 10 INJECTION, EMULSION INTRAVENOUS at 13:36

## 2022-10-05 NOTE — DISCHARGE INSTRUCTIONS
Recommendations:    1. Continue present PPI therapy. 2.  Further recommendations pending clinical course as needed. GI evaluation completed at this time. 3.  Follow up as needed.

## 2022-10-05 NOTE — PROCEDURES
EGD procedure note    Date of procedure: 10/05/22     Indication for procedure: Abdominal pain    Type of procedure: Upper endoscopy     Anesthesia classification: ASA class 2    Patient history and physical has been accomplished and documented. Patient is assessed and determined to be an appropriate candidate for planned procedure and sedation. The patient was assessed immediately prior to sedation. Sedation plan: MAC per anesthesia. Surgical assistant: Not applicable    Airway assessment: Range of motion: normal, mouth opening: Normal, visual obstruction: No.    PREOP EXAM:  Unchanged. VS: Reviewed  Gen: WDWN in NAD  CV: RRR, no murmur  Resp: CTAB  Abd: Soft, NTND, +BS  Extrem: No cyanosis or edema  Neuro: Awake and alert      Informed consent obtained: Yes. The indications, risks, including but not limited to bleeding, perforation, infection, death, potential for failure to visualize or diagnose neoplasia, alternatives, benefits, discussed in detail with the patient prior to the procedure. Patient understands and agrees to the procedure. Patient identity and procedure were verified, consent was obtained, and is consistent with the consent form in the patient's records. INSTRUMENT: Olympus upper endoscope    PROCEDURE FINDINGS:    OROPHARYNX: Normal    ESOPHAGUS:  Esophageal mucosa normal with no masses noted in the proximal, mid, and distal esophagus. No endoscopic features of eosinophilic esophagitis were noted. The Z-line was at 39 cm. and was normal.    No hiatal hernia was noted distally. STOMACH: Stomach was then evaluated including the cardia and fundus on retroflexion view. Evaluation of the gastric body, antrum, and pylorus were normal, including normal gastric mucosa with no masses, ulcers, or mucosal abnormalities.     Retroflexion view of the cardia and fundus were normal.     DUODENUM:  The duodenal bulb and descending duodenum were then evaluated and found to be normal including no masses, ulcers, or or mucosal abnormalities. SPECIMENS: None    ESTIMATED BLOOD LOSS:  None. COMPLICATIONS:  None     COMMENTS: none    Impression:  1. Normal upper endoscopy. 2.  With normal abdominal ultrasound, I can find no GI etiology to explain the patient's symptoms. They may be non-GI or functional in nature. Recommendations:    1. Continue present PPI therapy. 2.  Further recommendations pending clinical course as needed. GI evaluation completed at this time. 3.  Follow up as needed.        Brendon Kraus MD

## 2022-10-05 NOTE — INTERVAL H&P NOTE
Update History & Physical    The Patient's History and Physical of October 5, 2022  The procedure was reviewed with the patient and I examined the patient. There was no change. The surgical site was confirmed by the patient and me. Plan:  The risk, benefits, expected outcome, and alternative to the recommended procedure have been discussed with the patient. Patient understands and wants to proceed with the procedure.     Electronically signed by Svetlana Mcrae MD on 10/5/2022 at 12:37 PM

## 2022-10-05 NOTE — ANESTHESIA PREPROCEDURE EVALUATION
Relevant Problems   No relevant active problems       Anesthetic History   No history of anesthetic complications            Review of Systems / Medical History  Patient summary reviewed, nursing notes reviewed and pertinent labs reviewed    Pulmonary  Within defined limits                 Neuro/Psych   Within defined limits           Cardiovascular    Hypertension              Exercise tolerance: >4 METS     GI/Hepatic/Renal     GERD           Endo/Other    Diabetes    Arthritis     Other Findings   Comments: Arnold-Chiari syndrome (HCC)           Physical Exam    Airway  Mallampati: II  TM Distance: 4 - 6 cm  Neck ROM: normal range of motion   Mouth opening: Normal     Cardiovascular  Regular rate and rhythm,  S1 and S2 normal,  no murmur, click, rub, or gallop  Rhythm: regular  Rate: normal         Dental  No notable dental hx       Pulmonary  Breath sounds clear to auscultation               Abdominal  GI exam deferred       Other Findings            Anesthetic Plan    ASA: 2  Anesthesia type: total IV anesthesia          Induction: Intravenous  Anesthetic plan and risks discussed with: Patient

## 2022-10-05 NOTE — ANESTHESIA POSTPROCEDURE EVALUATION
Procedure(s):  ESOPHAGOGASTRODUODENOSCOPY (EGD). total IV anesthesia    Anesthesia Post Evaluation      Multimodal analgesia: multimodal analgesia used between 6 hours prior to anesthesia start to PACU discharge  Patient location during evaluation: bedside  Patient participation: complete - patient participated  Level of consciousness: awake and responsive to physical stimuli  Pain management: satisfactory to patient  Airway patency: patent  Anesthetic complications: no  Cardiovascular status: acceptable  Respiratory status: acceptable  Hydration status: acceptable  Comments: Patient is awake and comfortable post operatively. Report to RN at bedside.   Post anesthesia nausea and vomiting:  none  Final Post Anesthesia Temperature Assessment:  Normothermia (36.0-37.5 degrees C)      INITIAL Post-op Vital signs:   Vitals Value Taken Time   /62 10/05/22 1342   Temp 36.4 °C (97.5 °F) 10/05/22 1342   Pulse 89 10/05/22 1342   Resp 16 10/05/22 1342   SpO2 99 % 10/05/22 1342

## 2022-10-05 NOTE — PERIOP NOTES
Pt c/o discomfort in her chest. Daniel Jain, CRNA in to see pt and explained to pt that that was not uncommon after procedure and if it got worse to go to ER.

## 2022-10-06 ENCOUNTER — APPOINTMENT (OUTPATIENT)
Dept: CT IMAGING | Age: 52
End: 2022-10-06
Attending: INTERNAL MEDICINE
Payer: COMMERCIAL

## 2022-10-06 ENCOUNTER — HOSPITAL ENCOUNTER (EMERGENCY)
Age: 52
Discharge: HOME | End: 2022-10-06
Attending: INTERNAL MEDICINE
Payer: COMMERCIAL

## 2022-10-06 ENCOUNTER — TELEPHONE (OUTPATIENT)
Dept: GASTROENTEROLOGY | Age: 52
End: 2022-10-06

## 2022-10-06 VITALS
DIASTOLIC BLOOD PRESSURE: 82 MMHG | HEIGHT: 64 IN | WEIGHT: 170 LBS | RESPIRATION RATE: 16 BRPM | HEART RATE: 67 BPM | TEMPERATURE: 97.9 F | SYSTOLIC BLOOD PRESSURE: 104 MMHG | OXYGEN SATURATION: 100 % | BODY MASS INDEX: 29.02 KG/M2

## 2022-10-06 DIAGNOSIS — R51.9 ACUTE NONINTRACTABLE HEADACHE, UNSPECIFIED HEADACHE TYPE: Primary | ICD-10-CM

## 2022-10-06 PROCEDURE — 99284 EMERGENCY DEPT VISIT MOD MDM: CPT

## 2022-10-06 PROCEDURE — 70450 CT HEAD/BRAIN W/O DYE: CPT

## 2022-10-06 PROCEDURE — 74011250637 HC RX REV CODE- 250/637: Performed by: INTERNAL MEDICINE

## 2022-10-06 PROCEDURE — 74011250636 HC RX REV CODE- 250/636: Performed by: INTERNAL MEDICINE

## 2022-10-06 PROCEDURE — 96372 THER/PROPH/DIAG INJ SC/IM: CPT

## 2022-10-06 RX ORDER — KETOROLAC TROMETHAMINE 30 MG/ML
30 INJECTION, SOLUTION INTRAMUSCULAR; INTRAVENOUS
Status: COMPLETED | OUTPATIENT
Start: 2022-10-06 | End: 2022-10-06

## 2022-10-06 RX ORDER — METOCLOPRAMIDE 10 MG/1
10 TABLET ORAL
Status: COMPLETED | OUTPATIENT
Start: 2022-10-06 | End: 2022-10-06

## 2022-10-06 RX ORDER — ACETAMINOPHEN 500 MG
1000 TABLET ORAL
Status: COMPLETED | OUTPATIENT
Start: 2022-10-06 | End: 2022-10-06

## 2022-10-06 RX ORDER — METOCLOPRAMIDE HYDROCHLORIDE 5 MG/ML
10 INJECTION INTRAMUSCULAR; INTRAVENOUS
Status: DISCONTINUED | OUTPATIENT
Start: 2022-10-06 | End: 2022-10-06

## 2022-10-06 RX ORDER — DIPHENHYDRAMINE HCL 25 MG
25 TABLET ORAL
Status: DISCONTINUED | OUTPATIENT
Start: 2022-10-06 | End: 2022-10-06 | Stop reason: HOSPADM

## 2022-10-06 RX ORDER — KETOROLAC TROMETHAMINE 30 MG/ML
60 INJECTION, SOLUTION INTRAMUSCULAR; INTRAVENOUS
Status: DISCONTINUED | OUTPATIENT
Start: 2022-10-06 | End: 2022-10-06

## 2022-10-06 RX ORDER — DIPHENHYDRAMINE HYDROCHLORIDE 50 MG/ML
25 INJECTION, SOLUTION INTRAMUSCULAR; INTRAVENOUS ONCE
Status: DISCONTINUED | OUTPATIENT
Start: 2022-10-06 | End: 2022-10-06

## 2022-10-06 RX ADMIN — KETOROLAC TROMETHAMINE 30 MG: 30 INJECTION, SOLUTION INTRAMUSCULAR at 14:39

## 2022-10-06 RX ADMIN — DIPHENHYDRAMINE HYDROCHLORIDE 25 MG: 25 TABLET ORAL at 14:39

## 2022-10-06 RX ADMIN — METOCLOPRAMIDE 10 MG: 10 TABLET ORAL at 14:39

## 2022-10-06 RX ADMIN — ACETAMINOPHEN 1000 MG: 500 TABLET ORAL at 14:39

## 2022-10-06 NOTE — TELEPHONE ENCOUNTER
Patient called today complaining of severe headache. I talked with her on the phone and she sounded very congested and said she had a sore throat. She had an EGD done yesterday which was normal.  She has a history of migraine headaches that are sometimes intractable per her chart. She tried Tylenol without any relief. I told the patient that I suggested she take ibuprofen 200 mg x 4 and see how that works. If by 1 or 2:00 she has no improvement, then I recommend she contact her primary care physician since she does have a history of migraine headaches. I did tell her I can give her a note to be off work today because of her headaches and after today she would need to talk to her primary care physician.
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
## 2022-10-06 NOTE — ED NOTES
Several IV attempts made by 2 nurses, unsuccessful. Unable to obtain blood or IV access.    MD deleon

## 2022-10-06 NOTE — ED TRIAGE NOTES
Pt states she had an endoscopy done yesterday, she had a headache when she left, but they told her to go home and take tylenol. Pt states she went to her PCP this am for the headache and she was told to come to the ED. Pt took tylenol around 4am, and Ibuprofen later in the am, but she has had no relief. Pain is frontal, however it goes through to the back of her head as well.   Pt also reports nasal congestion and sore throat

## 2023-02-28 ENCOUNTER — APPOINTMENT (OUTPATIENT)
Age: 53
End: 2023-02-28
Payer: COMMERCIAL

## 2023-02-28 ENCOUNTER — HOSPITAL ENCOUNTER (EMERGENCY)
Age: 53
Discharge: HOME | End: 2023-02-28
Attending: EMERGENCY MEDICINE | Admitting: EMERGENCY MEDICINE
Payer: COMMERCIAL

## 2023-02-28 VITALS
OXYGEN SATURATION: 97 % | TEMPERATURE: 98 F | HEART RATE: 90 BPM | DIASTOLIC BLOOD PRESSURE: 74 MMHG | BODY MASS INDEX: 26.68 KG/M2 | SYSTOLIC BLOOD PRESSURE: 162 MMHG | RESPIRATION RATE: 18 BRPM | WEIGHT: 170 LBS | HEIGHT: 67 IN

## 2023-02-28 DIAGNOSIS — M25.522 LEFT ELBOW PAIN: Primary | ICD-10-CM

## 2023-02-28 PROCEDURE — 99283 EMERGENCY DEPT VISIT LOW MDM: CPT

## 2023-02-28 PROCEDURE — 6370000000 HC RX 637 (ALT 250 FOR IP): Performed by: EMERGENCY MEDICINE

## 2023-02-28 PROCEDURE — 73080 X-RAY EXAM OF ELBOW: CPT

## 2023-02-28 RX ORDER — DICLOFENAC SODIUM 75 MG/1
75 TABLET, DELAYED RELEASE ORAL 2 TIMES DAILY
Qty: 60 TABLET | Refills: 0 | Status: SHIPPED | OUTPATIENT
Start: 2023-02-28

## 2023-02-28 RX ORDER — IBUPROFEN 600 MG/1
600 TABLET ORAL
Status: COMPLETED | OUTPATIENT
Start: 2023-02-28 | End: 2023-02-28

## 2023-02-28 RX ADMIN — IBUPROFEN 600 MG: 600 TABLET, FILM COATED ORAL at 06:20

## 2023-02-28 ASSESSMENT — LIFESTYLE VARIABLES
HOW MANY STANDARD DRINKS CONTAINING ALCOHOL DO YOU HAVE ON A TYPICAL DAY: PATIENT DOES NOT DRINK
HOW OFTEN DO YOU HAVE A DRINK CONTAINING ALCOHOL: NEVER
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
## 2023-02-28 NOTE — ED TRIAGE NOTES
Pt states she has had L arm pain for 2 weeks. Pt denies any known injury but states a month ago she hit the same arm in the area that has been hurting while at work.

## 2023-02-28 NOTE — DISCHARGE INSTRUCTIONS
You were seen and evaluated for your left elbow pain. X-rays do not show any fracture or significant swelling or arthritis. You are given exercises to do for your elbow. Use the anti-inflammatories as directed. Ice may benefit. Follow-up with PCP. Return the emergency room for worsening symptoms or any other concerns.

## 2023-05-08 ENCOUNTER — HOSPITAL ENCOUNTER (EMERGENCY)
Age: 53
Discharge: HOME | End: 2023-05-08
Attending: FAMILY MEDICINE
Payer: COMMERCIAL

## 2023-05-08 VITALS
BODY MASS INDEX: 27.41 KG/M2 | OXYGEN SATURATION: 98 % | WEIGHT: 175 LBS | DIASTOLIC BLOOD PRESSURE: 72 MMHG | SYSTOLIC BLOOD PRESSURE: 150 MMHG | RESPIRATION RATE: 18 BRPM | HEART RATE: 78 BPM | TEMPERATURE: 98.3 F

## 2023-05-08 DIAGNOSIS — S46.811A STRAIN OF RIGHT TRAPEZIUS MUSCLE, INITIAL ENCOUNTER: ICD-10-CM

## 2023-05-08 DIAGNOSIS — S46.911A STRAIN OF RIGHT SHOULDER, INITIAL ENCOUNTER: Primary | ICD-10-CM

## 2023-05-08 DIAGNOSIS — M62.838 SPASM OF MUSCLE: ICD-10-CM

## 2023-05-08 PROCEDURE — 99284 EMERGENCY DEPT VISIT MOD MDM: CPT

## 2023-05-08 PROCEDURE — 96372 THER/PROPH/DIAG INJ SC/IM: CPT

## 2023-05-08 PROCEDURE — 6370000000 HC RX 637 (ALT 250 FOR IP): Performed by: FAMILY MEDICINE

## 2023-05-08 PROCEDURE — 6360000002 HC RX W HCPCS: Performed by: FAMILY MEDICINE

## 2023-05-08 RX ORDER — DICLOFENAC POTASSIUM 50 MG/1
50 TABLET, FILM COATED ORAL 3 TIMES DAILY
Qty: 30 TABLET | Refills: 3 | Status: SHIPPED | OUTPATIENT
Start: 2023-05-08

## 2023-05-08 RX ORDER — KETOROLAC TROMETHAMINE 30 MG/ML
60 INJECTION, SOLUTION INTRAMUSCULAR; INTRAVENOUS
Status: COMPLETED | OUTPATIENT
Start: 2023-05-08 | End: 2023-05-08

## 2023-05-08 RX ORDER — LIDOCAINE 4 G/G
1 PATCH TOPICAL
Status: DISCONTINUED | OUTPATIENT
Start: 2023-05-08 | End: 2023-05-08 | Stop reason: HOSPADM

## 2023-05-08 RX ORDER — BACLOFEN 10 MG/1
20 TABLET ORAL 3 TIMES DAILY PRN
Qty: 20 TABLET | Refills: 0 | Status: SHIPPED | OUTPATIENT
Start: 2023-05-08 | End: 2023-05-08 | Stop reason: SDUPTHER

## 2023-05-08 RX ORDER — DICLOFENAC POTASSIUM 50 MG/1
50 TABLET, FILM COATED ORAL 3 TIMES DAILY
Qty: 30 TABLET | Refills: 3 | Status: SHIPPED | OUTPATIENT
Start: 2023-05-08 | End: 2023-05-08 | Stop reason: SDUPTHER

## 2023-05-08 RX ORDER — BACLOFEN 10 MG/1
20 TABLET ORAL 3 TIMES DAILY PRN
Qty: 20 TABLET | Refills: 0 | Status: SHIPPED | OUTPATIENT
Start: 2023-05-08

## 2023-05-08 RX ADMIN — KETOROLAC TROMETHAMINE 60 MG: 30 INJECTION, SOLUTION INTRAMUSCULAR at 04:10

## 2023-05-08 ASSESSMENT — ENCOUNTER SYMPTOMS
GASTROINTESTINAL NEGATIVE: 1
RESPIRATORY NEGATIVE: 1

## 2023-05-08 NOTE — ED TRIAGE NOTES
Pt states she has R shoulder pain that started yesterday after cleaning her house. No known injury or trauma. Pt states pain is 8/10. No acute distress noted. Pt resting comfortably on stretcher.

## 2023-05-08 NOTE — ED PROVIDER NOTES
either signs or Co-signs this chart in the absence of a cardiologist.    NA    RADIOLOGY:   Non-plain film images such as CT, Ultrasound and MRI are read by the radiologist. Plain radiographic images are visualized and preliminarily interpreted by the emergency physician with the below findings:    NA    Interpretation per the Radiologist below, if available at the time of this note:    No orders to display         ED BEDSIDE ULTRASOUND:   Performed by ED Physician - none    LABS:  Labs Reviewed - No data to display    All other labs were within normal range or not returned as of this dictation. EMERGENCY DEPARTMENT COURSE and DIFFERENTIAL DIAGNOSIS/MDM:   Vitals:    Vitals:    05/08/23 0344   BP: (!) 150/72   Pulse: 78   Resp: 18   Temp: 98.3 °F (36.8 °C)   TempSrc: Oral   SpO2: 98%   Weight: 175 lb (79.4 kg)           Medical Decision Making  Ddx including muscle strain, muscle spasm, arthritis, tendonitis, overuse injury. Patient drove herself and requesting non-sedating treatment for pain. Will use those medications in ED and prescribe muscle relaxant for patient to use once safely home. No history of trauma or indication for emergent x-ray. Patient stable for discharge with outpatient follow up    Amount and/or Complexity of Data Reviewed  External Data Reviewed: notes. Risk  OTC drugs. Prescription drug management. REASSESSMENT          CRITICAL CARE TIME   Total Critical Care time was 0 minutes, excluding separately reportable procedures. There was a high probability of clinically significant/life threatening deterioration in the patient's condition which required my urgent intervention. CONSULTS:  None    PROCEDURES:  Unless otherwise noted below, none     Procedures        FINAL IMPRESSION      1. Strain of right shoulder, initial encounter    2. Strain of right trapezius muscle, initial encounter    3.  Spasm of muscle          DISPOSITION/PLAN   DISPOSITION Decision To Discharge

## 2023-05-09 ENCOUNTER — HOSPITAL ENCOUNTER (EMERGENCY)
Age: 53
Discharge: HOME | End: 2023-05-09
Attending: EMERGENCY MEDICINE
Payer: COMMERCIAL

## 2023-05-09 VITALS
BODY MASS INDEX: 29.02 KG/M2 | RESPIRATION RATE: 18 BRPM | DIASTOLIC BLOOD PRESSURE: 82 MMHG | HEIGHT: 64 IN | WEIGHT: 170 LBS | TEMPERATURE: 98.2 F | OXYGEN SATURATION: 100 % | HEART RATE: 75 BPM | SYSTOLIC BLOOD PRESSURE: 153 MMHG

## 2023-05-09 DIAGNOSIS — S46.911D STRAIN OF RIGHT SHOULDER, SUBSEQUENT ENCOUNTER: Primary | ICD-10-CM

## 2023-05-09 PROCEDURE — 99283 EMERGENCY DEPT VISIT LOW MDM: CPT

## 2023-05-09 RX ORDER — OXYCODONE HYDROCHLORIDE AND ACETAMINOPHEN 5; 325 MG/1; MG/1
1 TABLET ORAL EVERY 6 HOURS PRN
Qty: 5 TABLET | Refills: 0 | Status: SHIPPED | OUTPATIENT
Start: 2023-05-09 | End: 2023-05-12

## 2023-05-09 ASSESSMENT — LIFESTYLE VARIABLES
HOW OFTEN DO YOU HAVE A DRINK CONTAINING ALCOHOL: NEVER
HOW MANY STANDARD DRINKS CONTAINING ALCOHOL DO YOU HAVE ON A TYPICAL DAY: PATIENT DOES NOT DRINK

## 2023-05-09 ASSESSMENT — PAIN DESCRIPTION - ORIENTATION: ORIENTATION: RIGHT

## 2023-05-09 ASSESSMENT — PAIN DESCRIPTION - LOCATION: LOCATION: SHOULDER

## 2023-05-09 ASSESSMENT — PAIN DESCRIPTION - PAIN TYPE: TYPE: ACUTE PAIN

## 2023-05-09 ASSESSMENT — PAIN SCALES - GENERAL: PAINLEVEL_OUTOF10: 10

## 2023-05-09 ASSESSMENT — PAIN - FUNCTIONAL ASSESSMENT: PAIN_FUNCTIONAL_ASSESSMENT: 0-10

## 2023-05-09 NOTE — ED PROVIDER NOTES
nerve deficit. Sensory: No sensory deficit. Motor: No weakness. Gait: Gait normal.       DIAGNOSTIC RESULTS     EKG: All EKG's are interpreted by the Emergency Department Physician who either signs or Co-signs this chart in the absence of a cardiologist.        RADIOLOGY:   Non-plain film images such as CT, Ultrasound and MRI are read by the radiologist. Plain radiographic images are visualized and preliminarily interpreted by the emergency physician with the below findings:        Interpretation per the Radiologist below, if available at the time of this note:    No orders to display         ED BEDSIDE ULTRASOUND:   Performed by ED Physician - none    LABS:  Labs Reviewed - No data to display    All other labs were within normal range or not returned as of this dictation. EMERGENCY DEPARTMENT COURSE and DIFFERENTIAL DIAGNOSIS/MDM:   Vitals:    Vitals:    05/09/23 0938   BP: (!) 152/90   Pulse: 93   Resp: 18   Temp: 98.1 °F (36.7 °C)   TempSrc: Oral   SpO2: 99%   Weight: 170 lb (77.1 kg)   Height: 5' 4\" (1.626 m)           Medical Decision Making  Primary concern for muscle strain for which we have discussed supportive care, sling temporarily, 5 tablet prescription for Percocet and range of motion exercises to prevent adhesive capsulitis as well as orthopedics follow-up if needed. Interpreted the patient's vital signs. Risk  Prescription drug management. REASSESSMENT              FINAL IMPRESSION      1.  Strain of right shoulder, subsequent encounter          DISPOSITION/PLAN   DISPOSITION Decision To Discharge 05/09/2023 09:49:50 AM      PATIENT REFERRED TO:  Reva Ruffin MD  [address] [address]  [phone]    Schedule an appointment as soon as possible for a visit   As needed    Vladimir Regalado MD  [address] [place] [address]  [phone]    Schedule an appointment as soon as possible for a visit in 1 week  If symptoms

## 2023-05-09 NOTE — ED TRIAGE NOTES
Pt reports continued right shoulder pain after moving some stuff out of her closet on Sunday. States she was seen yesterday but the pain is worse today, states she has an appointment with ortho on Thursday.

## 2023-05-09 NOTE — DISCHARGE INSTRUCTIONS
You seem to have strained the right bicep muscle after picking up a crate this past weekend. You can use the sling as provided temporarily; however, you need to remove it in the sling and do range of motion exercises of your shoulder and elbow every hour for approximately 5 to 10 minutes to prevent frozen shoulder and stiff elbow. I have given you contact information about seeing an orthopedic surgeon if the pain persists in about 5 days. You may need some physical therapy as an outpatient.

## 2023-05-11 ENCOUNTER — OFFICE VISIT (OUTPATIENT)
Age: 53
End: 2023-05-11

## 2023-05-11 DIAGNOSIS — M75.51 BURSITIS OF RIGHT SHOULDER: Primary | ICD-10-CM

## 2023-05-11 DIAGNOSIS — M25.511 RIGHT SHOULDER PAIN, UNSPECIFIED CHRONICITY: ICD-10-CM

## 2023-05-11 PROBLEM — R51 HEADACHE(784.0): Status: ACTIVE | Noted: 2023-05-11

## 2023-05-11 PROBLEM — M53.1 OTHER SYNDROMES AFFECTING CERVICAL REGION: Status: ACTIVE | Noted: 2023-05-11

## 2023-05-11 PROBLEM — M19.90 OSTEOARTHRITIS: Status: ACTIVE | Noted: 2023-05-11

## 2023-05-11 PROBLEM — G43.919 INTRACTABLE MIGRAINE: Status: ACTIVE | Noted: 2023-05-11

## 2023-05-11 RX ORDER — LIDOCAINE HYDROCHLORIDE 10 MG/ML
9 INJECTION, SOLUTION INFILTRATION; PERINEURAL ONCE
Status: COMPLETED | OUTPATIENT
Start: 2023-05-11 | End: 2023-05-11

## 2023-05-11 RX ORDER — TRIAMCINOLONE ACETONIDE 40 MG/ML
40 INJECTION, SUSPENSION INTRA-ARTICULAR; INTRAMUSCULAR ONCE
Status: COMPLETED | OUTPATIENT
Start: 2023-05-11 | End: 2023-05-11

## 2023-05-11 RX ADMIN — TRIAMCINOLONE ACETONIDE 40 MG: 40 INJECTION, SUSPENSION INTRA-ARTICULAR; INTRAMUSCULAR at 15:35

## 2023-05-11 RX ADMIN — LIDOCAINE HYDROCHLORIDE 9 ML: 10 INJECTION, SOLUTION INFILTRATION; PERINEURAL at 15:35

## 2023-05-11 NOTE — PATIENT INSTRUCTIONS
the chest to the neck, jaw, or one or both shoulders or arms. Dizziness or lightheadedness. A fast or uneven pulse. After calling 911, chew 1 adult-strength aspirin. Wait for an ambulance. Do not try to drive yourself. Your arm or hand is cool or pale or changes color. Call your doctor now or seek immediate medical care if:    You have signs of infection, such as: Increased pain, swelling, warmth, or redness in your shoulder. Red streaks leading from a place on your shoulder. Pus draining from an area of your shoulder. Swollen lymph nodes in your neck, armpits, or groin. A fever. Watch closely for changes in your health, and be sure to contact your doctor if:    You cannot use your shoulder. Your shoulder does not get better as expected. Where can you learn more? Go to http://www.woods.com/ and enter H996 to learn more about \"Shoulder Pain: Care Instructions. \"  Current as of: March 9, 2022               Content Version: 13.5  © 2006-2022 [employer]. Care instructions adapted under license by [facility] ([facility]). If you have questions about a medical condition or this instruction, always ask your healthcare professional. [place] [address] any warranty or liability for your use of this information.

## 2023-05-11 NOTE — PROGRESS NOTES
Name: Maryann Flal    : 1970     [facility] [address] [place] [address] Danis Ervin, [facility] [address]  Dept: [phone]  Dept Fax: [phone]     Chief Complaint   Patient presents with    Shoulder Pain        There were no vitals taken for this visit. Allergies   Allergen Reactions    Levofloxacin Rash and Nausea And Vomiting        Current Outpatient Medications   Medication Sig Dispense Refill    oxyCODONE-acetaminophen (PERCOCET) 5-325 MG per tablet Take 1 tablet by mouth every 6 hours as needed for Pain for up to 3 days. Intended supply: 3 days. Take lowest dose possible to manage pain Max Daily Amount: 4 tablets 5 tablet 0    metoprolol tartrate (LOPRESSOR) 25 MG tablet 1 tablet      baclofen (LIORESAL) 10 MG tablet Take 2 tablets by mouth 3 times daily as needed (muscle spasm) 20 tablet 0    diclofenac (CATAFLAM) 50 MG tablet Take 1 tablet by mouth 3 times daily 30 tablet 3    metFORMIN (GLUCOPHAGE) 500 MG tablet Take 500 mg by mouth daily      pantoprazole (PROTONIX) 40 MG tablet Take 40 mg by mouth daily      zolpidem (AMBIEN) 10 MG tablet Take 10 mg by mouth. No current facility-administered medications for this visit.       Patient Active Problem List   Diagnosis    Gastroesophageal reflux disease without esophagitis    Lumbosacral spondylosis without myelopathy    Pain in joint, multiple sites    Lumbago    Cervicalgia    Encounter for long-term (current) use of other medications    Paresthesia    Other syndromes affecting cervical region    Osteoarthritis    Intractable migraine    Headache(784.0)      Family History   Problem Relation Age of Onset    Breast Cancer Maternal Aunt     Cancer Maternal Aunt     Prostate Cancer Father     Lung Cancer Father     Headache Father     Asthma Father     Diabetes Father     Hypertension Father     Hypertension Mother     Diabetes Mother     Headache Mother

## 2023-06-02 ENCOUNTER — HOSPITAL ENCOUNTER (OUTPATIENT)
Age: 53
Discharge: HOME | End: 2023-06-05

## 2023-06-02 LAB — LABCORP DRAW FEE: NORMAL

## 2023-06-19 ENCOUNTER — HOSPITAL ENCOUNTER (OUTPATIENT)
Age: 53
Discharge: HOME | End: 2023-06-22
Payer: COMMERCIAL

## 2023-06-19 DIAGNOSIS — M17.11 UNILATERAL PRIMARY OSTEOARTHRITIS, RIGHT KNEE: ICD-10-CM

## 2023-06-19 PROCEDURE — 73721 MRI JNT OF LWR EXTRE W/O DYE: CPT

## 2023-10-03 PROBLEM — R51.9 HEADACHE: Status: ACTIVE | Noted: 2023-05-11

## 2024-02-15 ENCOUNTER — APPOINTMENT (OUTPATIENT)
Age: 54
End: 2024-02-15
Attending: EMERGENCY MEDICINE
Payer: COMMERCIAL

## 2024-02-15 ENCOUNTER — APPOINTMENT (OUTPATIENT)
Age: 54
End: 2024-02-15
Payer: COMMERCIAL

## 2024-02-15 ENCOUNTER — HOSPITAL ENCOUNTER (EMERGENCY)
Age: 54
Discharge: HOME | End: 2024-02-15
Attending: EMERGENCY MEDICINE
Payer: COMMERCIAL

## 2024-02-15 VITALS
TEMPERATURE: 98.3 F | RESPIRATION RATE: 16 BRPM | BODY MASS INDEX: 29.71 KG/M2 | WEIGHT: 174 LBS | OXYGEN SATURATION: 100 % | DIASTOLIC BLOOD PRESSURE: 68 MMHG | HEART RATE: 88 BPM | HEIGHT: 64 IN | SYSTOLIC BLOOD PRESSURE: 118 MMHG

## 2024-02-15 DIAGNOSIS — M25.551 PAIN IN JOINT INVOLVING RIGHT PELVIC REGION AND THIGH: ICD-10-CM

## 2024-02-15 DIAGNOSIS — M25.561 RIGHT KNEE PAIN, UNSPECIFIED CHRONICITY: Primary | ICD-10-CM

## 2024-02-15 LAB — ECHO BSA: 1.89 M2

## 2024-02-15 PROCEDURE — 6370000000 HC RX 637 (ALT 250 FOR IP): Performed by: EMERGENCY MEDICINE

## 2024-02-15 PROCEDURE — 93971 EXTREMITY STUDY: CPT

## 2024-02-15 PROCEDURE — 73562 X-RAY EXAM OF KNEE 3: CPT

## 2024-02-15 PROCEDURE — 99284 EMERGENCY DEPT VISIT MOD MDM: CPT

## 2024-02-15 RX ORDER — OXYCODONE HYDROCHLORIDE AND ACETAMINOPHEN 5; 325 MG/1; MG/1
1 TABLET ORAL
Status: COMPLETED | OUTPATIENT
Start: 2024-02-15 | End: 2024-02-15

## 2024-02-15 RX ORDER — HYDROCODONE BITARTRATE AND ACETAMINOPHEN 5; 325 MG/1; MG/1
1 TABLET ORAL EVERY 6 HOURS PRN
COMMUNITY

## 2024-02-15 RX ORDER — NAPROXEN 500 MG/1
500 TABLET ORAL 2 TIMES DAILY WITH MEALS
COMMUNITY

## 2024-02-15 RX ADMIN — OXYCODONE AND ACETAMINOPHEN 1 TABLET: 5; 325 TABLET ORAL at 19:23

## 2024-02-15 NOTE — ED TRIAGE NOTES
Ems called for pt. Who igher extended/twisted her right knee this morning around 1100. Pt. States that she had an arthroscopy last week on this knee. Pt. Spoke with her surgeon and took her regular meds. Pt. States she had excruciating pain 30 minutes ago that was shooting up into her right thigh.

## 2024-02-16 NOTE — ED PROVIDER NOTES
OVARY REMOVAL      TONSILLECTOMY      UROLOGICAL SURGERY      Shunt in kidney         CURRENT MEDICATIONS       Previous Medications    HYDROCODONE-ACETAMINOPHEN (NORCO) 5-325 MG PER TABLET    Take 1 tablet by mouth every 6 hours as needed for Pain. Max Daily Amount: 4 tablets    METFORMIN (GLUCOPHAGE) 500 MG TABLET    Take 1 tablet by mouth daily    METOPROLOL TARTRATE (LOPRESSOR) 25 MG TABLET    Take 1 tablet by mouth daily    NAPROXEN (NAPROSYN) 500 MG TABLET    Take 1 tablet by mouth 2 times daily (with meals)    PANTOPRAZOLE (PROTONIX) 40 MG TABLET    Take 1 tablet by mouth daily    ZOLPIDEM (AMBIEN) 10 MG TABLET    Take 1 tablet by mouth.       ALLERGIES     Levofloxacin    FAMILY HISTORY       Family History   Problem Relation Age of Onset    Breast Cancer Maternal Aunt     Cancer Maternal Aunt     Prostate Cancer Father     Lung Cancer Father     Headache Father     Asthma Father     Diabetes Father     Hypertension Father     Hypertension Mother     Diabetes Mother     Headache Mother           SOCIAL HISTORY       Social History     Socioeconomic History    Marital status: Single     Spouse name: None    Number of children: None    Years of education: None    Highest education level: None   Tobacco Use    Smoking status: Never    Smokeless tobacco: Never   Vaping Use    Vaping Use: Never used   Substance and Sexual Activity    Alcohol use: Not Currently    Drug use: Never   Social History Narrative    ** Merged History Encounter **            SCREENINGS         [place] Coma Scale  Eye Opening: Spontaneous  Best Verbal Response: Oriented  Best Motor Response: Obeys commands  [place] Coma Scale Score: 15                     CIWA Assessment  BP: 133/78  Pulse: 88                 PHYSICAL EXAM    (up to 7 for level 4, 8 or more for level 5)     ED Triage Vitals [02/15/24 1853]   BP Temp Temp Source Pulse Respirations SpO2 Height Weight - Scale   (!) 152/80 98.3 °F (36.8 °C) Oral 88 16 98 % 1.626 m (5' 4\") 78.9

## 2024-02-16 NOTE — DISCHARGE INSTRUCTIONS
Return for new or worsening pain/swelling, fever not resolving with motrin or tylenol, shortness of breath, vomiting, decreased fluid intake, weakness, numbness, dizziness, or any change or concerns.

## 2024-08-05 ENCOUNTER — HOSPITAL ENCOUNTER (EMERGENCY)
Age: 54
Discharge: HOME | End: 2024-08-05
Attending: EMERGENCY MEDICINE

## 2024-08-05 VITALS
SYSTOLIC BLOOD PRESSURE: 130 MMHG | BODY MASS INDEX: 28.51 KG/M2 | HEART RATE: 82 BPM | HEIGHT: 64 IN | RESPIRATION RATE: 19 BRPM | WEIGHT: 167 LBS | TEMPERATURE: 98.3 F | OXYGEN SATURATION: 96 % | DIASTOLIC BLOOD PRESSURE: 72 MMHG

## 2024-08-05 DIAGNOSIS — M25.561 CHRONIC PAIN OF RIGHT KNEE: ICD-10-CM

## 2024-08-05 DIAGNOSIS — G89.29 CHRONIC PAIN OF RIGHT KNEE: ICD-10-CM

## 2024-08-05 PROCEDURE — 99283 EMERGENCY DEPT VISIT LOW MDM: CPT

## 2024-08-05 RX ORDER — AMLODIPINE BESYLATE 10 MG/1
10 TABLET ORAL DAILY
COMMUNITY

## 2024-08-05 RX ORDER — HYDROCODONE BITARTRATE AND ACETAMINOPHEN 5; 325 MG/1; MG/1
1 TABLET ORAL EVERY 6 HOURS PRN
Qty: 10 TABLET | Refills: 0 | Status: SHIPPED | OUTPATIENT
Start: 2024-08-05 | End: 2024-08-07

## 2024-08-05 RX ORDER — ACETAMINOPHEN 325 MG/1
TABLET ORAL
COMMUNITY
Start: 2024-01-30

## 2024-08-05 RX ORDER — ACETAMINOPHEN 500 MG
1000 TABLET ORAL EVERY 6 HOURS PRN
COMMUNITY
Start: 2024-04-18

## 2024-08-05 ASSESSMENT — PAIN - FUNCTIONAL ASSESSMENT: PAIN_FUNCTIONAL_ASSESSMENT: 0-10

## 2024-08-05 ASSESSMENT — PAIN DESCRIPTION - DESCRIPTORS: DESCRIPTORS: ACHING

## 2024-08-05 ASSESSMENT — PAIN DESCRIPTION - LOCATION: LOCATION: KNEE

## 2024-08-05 ASSESSMENT — PAIN SCALES - GENERAL: PAINLEVEL_OUTOF10: 9

## 2024-08-05 ASSESSMENT — PAIN DESCRIPTION - PAIN TYPE: TYPE: CHRONIC PAIN

## 2024-08-05 ASSESSMENT — PAIN DESCRIPTION - ORIENTATION: ORIENTATION: RIGHT
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
## 2024-09-05 ENCOUNTER — HOSPITAL ENCOUNTER (OUTPATIENT)
Age: 54
Discharge: HOME | End: 2024-09-08

## 2024-09-05 LAB — SENTARA SPECIMEN COLLECTION: NORMAL

## 2024-09-05 PROCEDURE — 99001 SPECIMEN HANDLING PT-LAB: CPT

## 2024-10-11 ENCOUNTER — HOSPITAL ENCOUNTER (EMERGENCY)
Age: 54
Discharge: HOME | End: 2024-10-11
Attending: EMERGENCY MEDICINE
Payer: MEDICAID

## 2024-10-11 VITALS
BODY MASS INDEX: 27.66 KG/M2 | DIASTOLIC BLOOD PRESSURE: 76 MMHG | TEMPERATURE: 98.8 F | HEIGHT: 64 IN | HEART RATE: 100 BPM | SYSTOLIC BLOOD PRESSURE: 140 MMHG | WEIGHT: 162 LBS | RESPIRATION RATE: 16 BRPM | OXYGEN SATURATION: 96 %

## 2024-10-11 DIAGNOSIS — H10.31 ACUTE CONJUNCTIVITIS OF RIGHT EYE, UNSPECIFIED ACUTE CONJUNCTIVITIS TYPE: Primary | ICD-10-CM

## 2024-10-11 PROCEDURE — 99283 EMERGENCY DEPT VISIT LOW MDM: CPT

## 2024-10-11 RX ORDER — CITALOPRAM HYDROBROMIDE 10 MG/1
1 TABLET ORAL DAILY
COMMUNITY

## 2024-10-11 RX ORDER — TRAMADOL HYDROCHLORIDE 50 MG/1
50 TABLET ORAL EVERY 6 HOURS PRN
COMMUNITY
Start: 2024-10-02 | End: 2024-10-13

## 2024-10-11 RX ORDER — POLYMYXIN B SULFATE AND TRIMETHOPRIM 1; 10000 MG/ML; [USP'U]/ML
1 SOLUTION OPHTHALMIC EVERY 4 HOURS
Qty: 3 ML | Refills: 0 | Status: SHIPPED | OUTPATIENT
Start: 2024-10-11 | End: 2024-10-21

## 2024-10-11 RX ORDER — LOSARTAN POTASSIUM 100 MG/1
1 TABLET ORAL DAILY
COMMUNITY
End: 2024-10-13

## 2024-10-11 ASSESSMENT — PAIN DESCRIPTION - LOCATION: LOCATION: EYE

## 2024-10-11 ASSESSMENT — PAIN SCALES - GENERAL: PAINLEVEL_OUTOF10: 9

## 2024-10-11 ASSESSMENT — PAIN - FUNCTIONAL ASSESSMENT: PAIN_FUNCTIONAL_ASSESSMENT: 0-10

## 2024-10-11 ASSESSMENT — PAIN DESCRIPTION - DESCRIPTORS: DESCRIPTORS: BURNING;ITCHING

## 2024-10-11 ASSESSMENT — PAIN DESCRIPTION - ORIENTATION: ORIENTATION: RIGHT

## 2024-10-11 NOTE — ED TRIAGE NOTES
Pt. Complains of eye redness, swelling and drainage for 2 days. Pt. States recent exposure to pink eye. PT. States she attempted to flush her eye with no relief. Pt. Also complains of bilateral ear pain.
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
## 2024-10-13 ENCOUNTER — HOSPITAL ENCOUNTER (EMERGENCY)
Age: 54
Discharge: HOME | End: 2024-10-13
Attending: EMERGENCY MEDICINE
Payer: MEDICAID

## 2024-10-13 VITALS
HEART RATE: 91 BPM | TEMPERATURE: 98.4 F | DIASTOLIC BLOOD PRESSURE: 81 MMHG | HEIGHT: 64 IN | WEIGHT: 179 LBS | RESPIRATION RATE: 16 BRPM | OXYGEN SATURATION: 97 % | BODY MASS INDEX: 30.56 KG/M2 | SYSTOLIC BLOOD PRESSURE: 155 MMHG

## 2024-10-13 DIAGNOSIS — H11.421 CHEMOSIS OF CONJUNCTIVA SUBCONJUNCTIVAL EDEMA, RIGHT: ICD-10-CM

## 2024-10-13 DIAGNOSIS — H10.9 BACTERIAL CONJUNCTIVITIS OF RIGHT EYE: Primary | ICD-10-CM

## 2024-10-13 PROCEDURE — 6370000000 HC RX 637 (ALT 250 FOR IP): Performed by: EMERGENCY MEDICINE

## 2024-10-13 PROCEDURE — 99283 EMERGENCY DEPT VISIT LOW MDM: CPT

## 2024-10-13 PROCEDURE — 87186 SC STD MICRODIL/AGAR DIL: CPT

## 2024-10-13 PROCEDURE — 87077 CULTURE AEROBIC IDENTIFY: CPT

## 2024-10-13 PROCEDURE — 87070 CULTURE OTHR SPECIMN AEROBIC: CPT

## 2024-10-13 RX ORDER — OXYCODONE AND ACETAMINOPHEN 5; 325 MG/1; MG/1
1 TABLET ORAL
Status: COMPLETED | OUTPATIENT
Start: 2024-10-13 | End: 2024-10-13

## 2024-10-13 RX ORDER — DOXYCYCLINE 100 MG/1
100 CAPSULE ORAL 2 TIMES DAILY
COMMUNITY

## 2024-10-13 RX ORDER — PREDNISONE 20 MG/1
40 TABLET ORAL
Status: COMPLETED | OUTPATIENT
Start: 2024-10-13 | End: 2024-10-13

## 2024-10-13 RX ORDER — PREDNISOLONE ACETATE 10 MG/ML
1 SUSPENSION/ DROPS OPHTHALMIC
Status: DISCONTINUED | OUTPATIENT
Start: 2024-10-13 | End: 2024-10-13 | Stop reason: HOSPADM

## 2024-10-13 RX ORDER — DIPHENHYDRAMINE HCL 25 MG
25 CAPSULE ORAL EVERY 6 HOURS PRN
Qty: 60 CAPSULE | Refills: 0 | Status: SHIPPED | OUTPATIENT
Start: 2024-10-13 | End: 2024-10-28

## 2024-10-13 RX ORDER — PREDNISOLONE ACETATE 10 MG/ML
SUSPENSION/ DROPS OPHTHALMIC
Qty: 5 ML | Refills: 0 | Status: SHIPPED | OUTPATIENT
Start: 2024-10-13

## 2024-10-13 RX ORDER — PREDNISONE 20 MG/1
20 TABLET ORAL 2 TIMES DAILY
Qty: 10 TABLET | Refills: 0 | Status: SHIPPED | OUTPATIENT
Start: 2024-10-13 | End: 2024-10-18

## 2024-10-13 RX ORDER — OXYCODONE AND ACETAMINOPHEN 5; 325 MG/1; MG/1
1 TABLET ORAL EVERY 6 HOURS PRN
Qty: 20 TABLET | Refills: 0 | Status: SHIPPED | OUTPATIENT
Start: 2024-10-13 | End: 2024-10-18

## 2024-10-13 RX ORDER — DIPHENHYDRAMINE HCL 25 MG
50 TABLET ORAL
Status: COMPLETED | OUTPATIENT
Start: 2024-10-13 | End: 2024-10-13

## 2024-10-13 RX ADMIN — PREDNISONE 40 MG: 20 TABLET ORAL at 08:53

## 2024-10-13 RX ADMIN — OXYCODONE HYDROCHLORIDE AND ACETAMINOPHEN 1 TABLET: 5; 325 TABLET ORAL at 08:53

## 2024-10-13 RX ADMIN — DIPHENHYDRAMINE HYDROCHLORIDE 50 MG: 25 TABLET ORAL at 08:54

## 2024-10-13 RX ADMIN — PREDNISOLONE ACETATE 1 DROP: 10 SUSPENSION/ DROPS OPHTHALMIC at 09:00

## 2024-10-13 ASSESSMENT — PAIN - FUNCTIONAL ASSESSMENT: PAIN_FUNCTIONAL_ASSESSMENT: 0-10

## 2024-10-13 ASSESSMENT — PAIN DESCRIPTION - LOCATION
LOCATION: EYE
LOCATION: EYE

## 2024-10-13 ASSESSMENT — PAIN DESCRIPTION - ORIENTATION: ORIENTATION: RIGHT

## 2024-10-13 ASSESSMENT — PAIN SCALES - GENERAL
PAINLEVEL_OUTOF10: 10
PAINLEVEL_OUTOF10: 10

## 2024-10-13 NOTE — ED PROVIDER NOTES
EMERGENCY DEPARTMENT HISTORY AND PHYSICAL EXAM    Date: 10/13/2024  Patient Name: Tere Cardona    History of Presenting Illness     Chief Complaint   Patient presents with    Conjunctivitis    Generalized Body Aches    Otalgia         History Provided By: Patient    Additional History (Context):   8:34 AM EDT  Tere Cardona is a 54 y.o. female with PMHX of reflux, spondylosis, low back pain, osteoarthritis who presents to the emergency department C/O eye problem and redness.  Patient complains of generalized bodyaches recently diagnosed with conjunctivitis.  She started medications now she has more swelling and drainage from the eye.  She complains of some right ear pain and generalized malaise.  No changes in visual acuity.    Social History  No significant smoking drinking or drugs    Family History  Family history documented below.    PCP: Andreia Baxter MD    Current Facility-Administered Medications   Medication Dose Route Frequency Provider Last Rate Last Admin    prednisoLONE acetate (PRED FORTE) 1 % ophthalmic suspension 1 drop  1 drop Right Eye Q4H While awake Refugio Mccoy MD         Current Outpatient Medications   Medication Sig Dispense Refill    doxycycline hyclate (VIBRAMYCIN) 100 MG capsule Take 1 capsule by mouth 2 times daily      predniSONE (DELTASONE) 20 MG tablet Take 1 tablet by mouth 2 times daily for 5 days 10 tablet 0    oxyCODONE-acetaminophen (PERCOCET) 5-325 MG per tablet Take 1 tablet by mouth every 6 hours as needed for Pain for up to 5 days. Intended supply: 5 days. Take lowest dose possible to manage pain Max Daily Amount: 4 tablets 20 tablet 0    diphenhydrAMINE (BENADRYL) 25 MG capsule Take 1 capsule by mouth every 6 hours as needed for Itching (swelling) 60 capsule 0    prednisoLONE acetate (PRED FORTE) 1 % ophthalmic suspension Placed 2 drops every 4 hours when awake and right eye.  Continue treatment up to 7 days. 5 mL 0    citalopram (CELEXA) 10 MG

## 2024-10-16 LAB
BACTERIA SPEC CULT: ABNORMAL
BACTERIA SPEC CULT: ABNORMAL
Lab: ABNORMAL

## 2024-11-11 ENCOUNTER — HOSPITAL ENCOUNTER (OUTPATIENT)
Age: 54
Discharge: HOME | End: 2024-11-14
Payer: MEDICAID

## 2024-11-11 ENCOUNTER — HOSPITAL ENCOUNTER (EMERGENCY)
Age: 54
Discharge: HOME | End: 2024-11-11
Attending: FAMILY MEDICINE
Payer: MEDICAID

## 2024-11-11 ENCOUNTER — APPOINTMENT (OUTPATIENT)
Age: 54
End: 2024-11-11
Payer: MEDICAID

## 2024-11-11 VITALS
SYSTOLIC BLOOD PRESSURE: 118 MMHG | RESPIRATION RATE: 18 BRPM | OXYGEN SATURATION: 96 % | BODY MASS INDEX: 28.17 KG/M2 | TEMPERATURE: 98.3 F | HEIGHT: 64 IN | DIASTOLIC BLOOD PRESSURE: 62 MMHG | WEIGHT: 165 LBS | HEART RATE: 89 BPM

## 2024-11-11 DIAGNOSIS — M75.102 TEAR OF LEFT ROTATOR CUFF, UNSPECIFIED TEAR EXTENT, UNSPECIFIED WHETHER TRAUMATIC: ICD-10-CM

## 2024-11-11 DIAGNOSIS — T78.40XA ALLERGY, INITIAL ENCOUNTER: ICD-10-CM

## 2024-11-11 DIAGNOSIS — J06.9 VIRAL UPPER RESPIRATORY INFECTION: Primary | ICD-10-CM

## 2024-11-11 LAB
FLUAV RNA SPEC QL NAA+PROBE: NOT DETECTED
FLUBV RNA SPEC QL NAA+PROBE: NOT DETECTED
SARS-COV-2 RNA RESP QL NAA+PROBE: NOT DETECTED

## 2024-11-11 PROCEDURE — 71045 X-RAY EXAM CHEST 1 VIEW: CPT

## 2024-11-11 PROCEDURE — 73221 MRI JOINT UPR EXTREM W/O DYE: CPT

## 2024-11-11 PROCEDURE — 99284 EMERGENCY DEPT VISIT MOD MDM: CPT

## 2024-11-11 PROCEDURE — 87636 SARSCOV2 & INF A&B AMP PRB: CPT

## 2024-11-11 ASSESSMENT — PAIN - FUNCTIONAL ASSESSMENT: PAIN_FUNCTIONAL_ASSESSMENT: 0-10

## 2024-11-11 ASSESSMENT — PAIN DESCRIPTION - DESCRIPTORS: DESCRIPTORS: THROBBING

## 2024-11-11 ASSESSMENT — PAIN DESCRIPTION - LOCATION: LOCATION: HEAD;THROAT

## 2024-11-11 ASSESSMENT — PAIN SCALES - GENERAL: PAINLEVEL_OUTOF10: 10

## 2024-11-11 NOTE — ED TRIAGE NOTES
Patient states she has chills, fever, runny nose, sore throat, headache, no appetite, right ear fullness and nausea. States she took her temp this morning and it was 100.0, she took 1 Tylenol 500 mg at 0600.

## 2024-11-11 NOTE — DISCHARGE INSTRUCTIONS
As we spoke, your COVID and flu are both negative chest x-ray is also negative, at this point I have high suspicion this is more of an allergy issue..  Or a viral upper respiratory infection recommend some over-the-counter Zyrtec.  Tylenol ibuprofen for any fevers, stay hydrated by drinking plenty of fluids follow-up with your primary care provider if you have any questions or concerns return here to the emergency department if you have difficulties with breathing or questions.
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
## 2024-11-12 ENCOUNTER — TRANSCRIBE ORDERS (OUTPATIENT)
Facility: HOSPITAL | Age: 54
End: 2024-11-12

## 2024-11-12 DIAGNOSIS — Z12.31 OTHER SCREENING MAMMOGRAM: Primary | ICD-10-CM

## 2024-11-18 ENCOUNTER — HOSPITAL ENCOUNTER (EMERGENCY)
Age: 54
Discharge: HOME | End: 2024-11-18
Attending: EMERGENCY MEDICINE
Payer: MEDICAID

## 2024-11-18 VITALS
DIASTOLIC BLOOD PRESSURE: 82 MMHG | BODY MASS INDEX: 31.41 KG/M2 | RESPIRATION RATE: 18 BRPM | HEART RATE: 95 BPM | WEIGHT: 183 LBS | OXYGEN SATURATION: 97 % | SYSTOLIC BLOOD PRESSURE: 141 MMHG | TEMPERATURE: 98.2 F

## 2024-11-18 DIAGNOSIS — J40 BRONCHITIS: ICD-10-CM

## 2024-11-18 DIAGNOSIS — J04.0 LARYNGITIS: Primary | ICD-10-CM

## 2024-11-18 DIAGNOSIS — J01.10 ACUTE NON-RECURRENT FRONTAL SINUSITIS: ICD-10-CM

## 2024-11-18 PROCEDURE — 6360000002 HC RX W HCPCS: Performed by: EMERGENCY MEDICINE

## 2024-11-18 PROCEDURE — 6370000000 HC RX 637 (ALT 250 FOR IP): Performed by: EMERGENCY MEDICINE

## 2024-11-18 PROCEDURE — 96372 THER/PROPH/DIAG INJ SC/IM: CPT

## 2024-11-18 PROCEDURE — 99284 EMERGENCY DEPT VISIT MOD MDM: CPT

## 2024-11-18 RX ORDER — BENZONATATE 100 MG/1
100-200 CAPSULE ORAL 3 TIMES DAILY PRN
Qty: 60 CAPSULE | Refills: 0 | Status: SHIPPED | OUTPATIENT
Start: 2024-11-18 | End: 2024-11-28

## 2024-11-18 RX ORDER — KETOROLAC TROMETHAMINE 30 MG/ML
15 INJECTION, SOLUTION INTRAMUSCULAR; INTRAVENOUS ONCE
Status: DISCONTINUED | OUTPATIENT
Start: 2024-11-18 | End: 2024-11-18

## 2024-11-18 RX ORDER — LIDOCAINE HYDROCHLORIDE 20 MG/ML
15 SOLUTION OROPHARYNGEAL
Status: COMPLETED | OUTPATIENT
Start: 2024-11-18 | End: 2024-11-18

## 2024-11-18 RX ORDER — ACETAMINOPHEN 500 MG
1000 TABLET ORAL 4 TIMES DAILY PRN
Qty: 30 TABLET | Refills: 0 | Status: SHIPPED | OUTPATIENT
Start: 2024-11-18

## 2024-11-18 RX ORDER — ALBUTEROL SULFATE 90 UG/1
2 INHALANT RESPIRATORY (INHALATION) 4 TIMES DAILY PRN
Qty: 18 G | Refills: 0 | Status: SHIPPED | OUTPATIENT
Start: 2024-11-18

## 2024-11-18 RX ORDER — IBUPROFEN 400 MG/1
400 TABLET, FILM COATED ORAL EVERY 6 HOURS PRN
Qty: 120 TABLET | Refills: 0 | Status: SHIPPED | OUTPATIENT
Start: 2024-11-18

## 2024-11-18 RX ORDER — KETOROLAC TROMETHAMINE 30 MG/ML
15 INJECTION, SOLUTION INTRAMUSCULAR; INTRAVENOUS ONCE
Status: COMPLETED | OUTPATIENT
Start: 2024-11-18 | End: 2024-11-18

## 2024-11-18 RX ORDER — FLUTICASONE PROPIONATE 50 MCG
2 SPRAY, SUSPENSION (ML) NASAL DAILY
Qty: 16 G | Refills: 0 | Status: SHIPPED | OUTPATIENT
Start: 2024-11-18

## 2024-11-18 RX ORDER — MAGNESIUM HYDROXIDE/ALUMINUM HYDROXICE/SIMETHICONE 120; 1200; 1200 MG/30ML; MG/30ML; MG/30ML
30 SUSPENSION ORAL
Status: COMPLETED | OUTPATIENT
Start: 2024-11-18 | End: 2024-11-18

## 2024-11-18 RX ORDER — DIPHENHYDRAMINE HCL 25 MG
25 CAPSULE ORAL EVERY 6 HOURS PRN
Qty: 20 CAPSULE | Refills: 0 | Status: SHIPPED | OUTPATIENT
Start: 2024-11-18 | End: 2024-11-28

## 2024-11-18 RX ORDER — DEXAMETHASONE SODIUM PHOSPHATE 10 MG/ML
10 INJECTION, SOLUTION INTRAMUSCULAR; INTRAVENOUS
Status: COMPLETED | OUTPATIENT
Start: 2024-11-18 | End: 2024-11-18

## 2024-11-18 RX ORDER — FEXOFENADINE HCL AND PSEUDOEPHEDRINE HCI 60; 120 MG/1; MG/1
1 TABLET, EXTENDED RELEASE ORAL 2 TIMES DAILY
Qty: 20 TABLET | Refills: 0 | Status: SHIPPED | OUTPATIENT
Start: 2024-11-18

## 2024-11-18 RX ORDER — GUAIFENESIN 600 MG/1
600 TABLET, EXTENDED RELEASE ORAL
Status: COMPLETED | OUTPATIENT
Start: 2024-11-18 | End: 2024-11-18

## 2024-11-18 RX ADMIN — ALUMINUM HYDROXIDE, MAGNESIUM HYDROXIDE, AND SIMETHICONE 30 ML: 200; 200; 20 SUSPENSION ORAL at 06:43

## 2024-11-18 RX ADMIN — KETOROLAC TROMETHAMINE 15 MG: 30 INJECTION, SOLUTION INTRAMUSCULAR at 06:45

## 2024-11-18 RX ADMIN — GUAIFENESIN 600 MG: 600 TABLET ORAL at 06:43

## 2024-11-18 RX ADMIN — DEXAMETHASONE SODIUM PHOSPHATE 10 MG: 10 INJECTION, SOLUTION INTRAMUSCULAR; INTRAVENOUS at 06:43

## 2024-11-18 RX ADMIN — LIDOCAINE HYDROCHLORIDE 15 ML: 20 SOLUTION ORAL at 06:46

## 2024-11-18 NOTE — ED PROVIDER NOTES
Pain     albuterol sulfate  (90 Base) MCG/ACT inhaler  Commonly known as: Ventolin HFA  Inhale 2 puffs into the lungs 4 times daily as needed for Wheezing     amoxicillin-clavulanate 875-125 MG per tablet  Commonly known as: AUGMENTIN  Take 1 tablet by mouth 2 times daily for 7 days     benzonatate 100 MG capsule  Commonly known as: TESSALON  Take 1-2 capsules by mouth 3 times daily as needed for Cough     diphenhydrAMINE 25 MG capsule  Commonly known as: BENADRYL  Take 1 capsule by mouth every 6 hours as needed for Itching     fexofenadine-pseudoephedrine  MG per extended release tablet  Commonly known as: ALLEGRA-D 12HR  Take 1 tablet by mouth 2 times daily     fluticasone 50 MCG/ACT nasal spray  Commonly known as: FLONASE  2 sprays by Each Nostril route daily     guaiFENesin-codeine 100-10 MG/5ML syrup  Commonly known as: TUSSI-ORGANIDIN NR  Take 5 mLs by mouth 3 times daily as needed for Cough for up to 7 days. Max Daily Amount: 15 mLs     ibuprofen 400 MG tablet  Commonly known as: IBU  Take 1 tablet by mouth every 6 hours as needed for Pain            ASK your doctor about these medications      citalopram 10 MG tablet  Commonly known as: CELEXA     doxycycline hyclate 100 MG capsule  Commonly known as: VIBRAMYCIN     metFORMIN 500 MG tablet  Commonly known as: GLUCOPHAGE     metoprolol tartrate 25 MG tablet  Commonly known as: LOPRESSOR     pantoprazole 40 MG tablet  Commonly known as: PROTONIX     prednisoLONE acetate 1 % ophthalmic suspension  Commonly known as: Pred Forte  Placed 2 drops every 4 hours when awake and right eye.  Continue treatment up to 7 days.     zolpidem 10 MG tablet  Commonly known as: AMBIEN               Where to Get Your Medications        These medications were sent to [facility] [address] [place] [address] - [address] [facility] -  [phone] - F [phone] [address] [facility] [address]      Phone: [phone]   acetaminophen 500 MG tablet  albuterol sulfate

## 2024-11-18 NOTE — ED TRIAGE NOTES
Pt states she was seen here last Monday for sore throat. Pt states she then went to urgent care Wednesday and was given a z pack. Pt states she is still having a sore throat, lost her voice, painful cough.

## 2024-11-26 ENCOUNTER — HOSPITAL ENCOUNTER (OUTPATIENT)
Age: 54
Discharge: HOME | End: 2024-11-29
Payer: MEDICAID

## 2024-11-26 VITALS — HEIGHT: 64 IN | BODY MASS INDEX: 29.02 KG/M2 | WEIGHT: 170 LBS

## 2024-11-26 DIAGNOSIS — Z12.31 OTHER SCREENING MAMMOGRAM: ICD-10-CM

## 2024-11-26 PROCEDURE — 77063 BREAST TOMOSYNTHESIS BI: CPT

## 2024-12-02 NOTE — ED TRIAGE NOTES
Failed protocol check list.   Lov 1/8/24 - no future visits scheduled.   Last labs 11/28/23.    Ayala , please contact pt to schedule an appointment with BH or MARY.    Patient states eye evaluation performed by Dr. Rodriguez's office on Friday and received eye drops for pink eye diagnosis.  States taking doxycycline as well.  She c/o right ear pain, right eye redness and pain, and right side facial pain.  C/o generalized body aches.

## 2024-12-06 ENCOUNTER — HOSPITAL ENCOUNTER (EMERGENCY)
Age: 54
Discharge: HOME | End: 2024-12-06
Attending: EMERGENCY MEDICINE
Payer: MEDICAID

## 2024-12-06 VITALS
DIASTOLIC BLOOD PRESSURE: 81 MMHG | OXYGEN SATURATION: 99 % | HEART RATE: 86 BPM | BODY MASS INDEX: 29.02 KG/M2 | HEIGHT: 64 IN | TEMPERATURE: 98.6 F | SYSTOLIC BLOOD PRESSURE: 148 MMHG | WEIGHT: 170 LBS | RESPIRATION RATE: 16 BRPM

## 2024-12-06 DIAGNOSIS — G89.29 CHRONIC PAIN OF RIGHT KNEE: Primary | ICD-10-CM

## 2024-12-06 DIAGNOSIS — M25.561 CHRONIC PAIN OF RIGHT KNEE: Primary | ICD-10-CM

## 2024-12-06 PROCEDURE — 99283 EMERGENCY DEPT VISIT LOW MDM: CPT

## 2024-12-06 RX ORDER — TRAMADOL HYDROCHLORIDE 50 MG/1
50 TABLET ORAL EVERY 6 HOURS PRN
Qty: 20 TABLET | Refills: 0 | Status: SHIPPED | OUTPATIENT
Start: 2024-12-06 | End: 2024-12-11

## 2024-12-06 ASSESSMENT — PAIN DESCRIPTION - ORIENTATION: ORIENTATION: RIGHT

## 2024-12-06 ASSESSMENT — PAIN - FUNCTIONAL ASSESSMENT: PAIN_FUNCTIONAL_ASSESSMENT: 0-10

## 2024-12-06 ASSESSMENT — PAIN SCALES - GENERAL: PAINLEVEL_OUTOF10: 10

## 2024-12-06 ASSESSMENT — PAIN DESCRIPTION - LOCATION: LOCATION: KNEE

## 2024-12-06 NOTE — ED TRIAGE NOTES
Pt states she had right knee reconstruction in February, states she started with pain and swelling a couple of days ago     Pt denies recent injury,  states she has taken every anti inflammatory medicine she can find and but nothing is working and she really needs something to just get rid of the pain

## 2024-12-06 NOTE — ED PROVIDER NOTES
times daily (Patient not taking: Reported on 11/11/2024)      prednisoLONE acetate (PRED FORTE) 1 % ophthalmic suspension Placed 2 drops every 4 hours when awake and right eye.  Continue treatment up to 7 days. 5 mL 0    citalopram (CELEXA) 10 MG tablet Take 1 tablet by mouth daily      metoprolol tartrate (LOPRESSOR) 25 MG tablet Take 1 tablet by mouth daily      metFORMIN (GLUCOPHAGE) 500 MG tablet Take 1 tablet by mouth daily      pantoprazole (PROTONIX) 40 MG tablet Take 1 tablet by mouth daily      zolpidem (AMBIEN) 10 MG tablet Take 1 tablet by mouth.         ALLERGIES:  Allergies   Allergen Reactions    Levofloxacin Nausea And Vomiting and Rash     Other Reaction(s): Not available       SOCIAL DETERMINANTS OF HEALTH:  Social Determinants of Health     Tobacco Use: Low Risk  (12/6/2024)    Patient History     Smoking Tobacco Use: Never     Smokeless Tobacco Use: Never     Passive Exposure: Not on file   Alcohol Use: Not At Risk (12/6/2024)    AUDIT-C     Frequency of Alcohol Consumption: Never     Average Number of Drinks: Patient does not drink     Frequency of Binge Drinking: Never   Financial Resource Strain: Not on file   Food Insecurity: Not on file   Transportation Needs: Not on file   Physical Activity: Not on file   Stress: Not on file   Social Connections: Not on file   Intimate Partner Violence: Not on file   Depression: Not at risk (7/27/2022)    PHQ-2     PHQ-2 Score: 0   Housing Stability: Not on file   Interpersonal Safety: Not At Risk (12/6/2024)    Interpersonal Safety Domain Source: IP Abuse Screening     Physical abuse: Denies     Verbal abuse: Denies     Emotional abuse: Denies     Financial abuse: Denies     Sexual abuse: Denies   Utilities: Not on file       Review of Systems     Negative except as listed above in HPI.    Physical Exam     Vitals:    12/06/24 0921   BP: (!) 148/81   Pulse: 86   Resp: 16   Temp: 98.6 °F (37 °C)   SpO2: 99%   Weight: 77.1 kg (170 lb)   Height: 1.626 m (5'  been discussed, and they are in agreement with the care plan formulated and outlined with them. I have encouraged them to ask questions as they arise throughout their visit.     My differential diagnosis included but was not limited to chronic knee pain, osteoarthritis.      Provider Notes (Medical Decision Making):     MDM  Patient presents with right knee pain.  She has history of chronic knee pain there is no significant findings on physical exam.  Patient will be discharged home with a prescription for tramadol and instructed to follow-up with her orthopedic surgeon for further evaluation.  She was instructed that she may need an MRI of her knee and she can also follow-up with her primary care provider.         Patient was given the following medications:  Medications - No data to display    CONSULTS: (Who and What was discussed)  None    Chronic Conditions: DM, GERD    Social Determinants affecting Dx or Tx: None    Records Reviewed (source and summary of external notes): Nursing Notes, Old Medical Records, Previous Radiology Studies, and Previous Laboratory Studies    @[email or web address]@    Critical Care Time: None    SCREENING TOOLS:  None    CLINICAL MANAGEMENT TOOLS:  Not Applicable    Positive and negative test results were discussed. My clinical assessment and recommendations were discussed. They agree with the plan of discharge. Return precautions were discussed. All questions were answered and they are in agreement with plan.    9:39 AM Upon re-evaluation the patient's symptoms have improved. Pt has non-toxic appearance and condition is stable for discharge. She was informed of her results, instructed to f/u with her orthopedic surgeon and return to the ED upon worsening of symptoms. All questions and concerns were addressed.      Is this patient to be included in the SEP-1 core measure? No Exclusion criteria - the patient is NOT to be included for SEP-1 Core Measure due to: Infection is not

## 2025-01-02 ENCOUNTER — APPOINTMENT (OUTPATIENT)
Age: 55
End: 2025-01-02
Payer: MEDICAID

## 2025-01-02 ENCOUNTER — HOSPITAL ENCOUNTER (EMERGENCY)
Age: 55
Discharge: HOME | End: 2025-01-02
Attending: EMERGENCY MEDICINE
Payer: MEDICAID

## 2025-01-02 VITALS
OXYGEN SATURATION: 98 % | HEIGHT: 64 IN | RESPIRATION RATE: 18 BRPM | SYSTOLIC BLOOD PRESSURE: 141 MMHG | WEIGHT: 160 LBS | DIASTOLIC BLOOD PRESSURE: 82 MMHG | HEART RATE: 86 BPM | TEMPERATURE: 98.3 F | BODY MASS INDEX: 27.31 KG/M2

## 2025-01-02 DIAGNOSIS — S46.912A LEFT SHOULDER STRAIN, INITIAL ENCOUNTER: ICD-10-CM

## 2025-01-02 DIAGNOSIS — M75.52 SUBACROMIAL BURSITIS OF LEFT SHOULDER JOINT: ICD-10-CM

## 2025-01-02 DIAGNOSIS — S83.91XA SPRAIN OF RIGHT KNEE, UNSPECIFIED LIGAMENT, INITIAL ENCOUNTER: Primary | ICD-10-CM

## 2025-01-02 DIAGNOSIS — M22.2X1 PATELLOFEMORAL SYNDROME OF RIGHT KNEE: ICD-10-CM

## 2025-01-02 DIAGNOSIS — Z98.890 H/O ARTHROSCOPIC KNEE SURGERY: ICD-10-CM

## 2025-01-02 PROCEDURE — 6370000000 HC RX 637 (ALT 250 FOR IP): Performed by: EMERGENCY MEDICINE

## 2025-01-02 PROCEDURE — 73030 X-RAY EXAM OF SHOULDER: CPT

## 2025-01-02 PROCEDURE — 73562 X-RAY EXAM OF KNEE 3: CPT

## 2025-01-02 PROCEDURE — 99283 EMERGENCY DEPT VISIT LOW MDM: CPT

## 2025-01-02 RX ORDER — CYCLOBENZAPRINE HCL 10 MG
10 TABLET ORAL 3 TIMES DAILY PRN
Qty: 21 TABLET | Refills: 0 | Status: SHIPPED | OUTPATIENT
Start: 2025-01-02 | End: 2025-01-12

## 2025-01-02 RX ORDER — IBUPROFEN 400 MG/1
400 TABLET, FILM COATED ORAL ONCE
Status: COMPLETED | OUTPATIENT
Start: 2025-01-02 | End: 2025-01-02

## 2025-01-02 RX ORDER — TRAMADOL HYDROCHLORIDE 50 MG/1
50 TABLET ORAL
Status: COMPLETED | OUTPATIENT
Start: 2025-01-02 | End: 2025-01-02

## 2025-01-02 RX ORDER — CYCLOBENZAPRINE HCL 10 MG
10 TABLET ORAL
Status: COMPLETED | OUTPATIENT
Start: 2025-01-02 | End: 2025-01-02

## 2025-01-02 RX ORDER — IBUPROFEN 600 MG/1
600 TABLET, FILM COATED ORAL EVERY 6 HOURS PRN
Qty: 120 TABLET | Refills: 0 | Status: SHIPPED | OUTPATIENT
Start: 2025-01-02

## 2025-01-02 RX ADMIN — TRAMADOL HYDROCHLORIDE 50 MG: 50 TABLET ORAL at 10:40

## 2025-01-02 RX ADMIN — IBUPROFEN 400 MG: 400 TABLET, FILM COATED ORAL at 10:40

## 2025-01-02 RX ADMIN — CYCLOBENZAPRINE HYDROCHLORIDE 10 MG: 10 TABLET, FILM COATED ORAL at 10:40

## 2025-01-02 ASSESSMENT — PAIN DESCRIPTION - LOCATION: LOCATION: SHOULDER;KNEE

## 2025-01-02 ASSESSMENT — PAIN - FUNCTIONAL ASSESSMENT: PAIN_FUNCTIONAL_ASSESSMENT: 0-10

## 2025-01-02 ASSESSMENT — PAIN SCALES - GENERAL: PAINLEVEL_OUTOF10: 10

## 2025-01-02 NOTE — ED PROVIDER NOTES
EMERGENCY DEPARTMENT HISTORY AND PHYSICAL EXAM    Date: 1/2/2025  Patient Name: Tere Cardona    History of Presenting Illness     Chief Complaint   Patient presents with   • Knee Pain         History Provided By: {[facility]:[id number]}    Additional History (Context):   9:28 AM LUZ Cardona is a 54 y.o. female with PMHX of *** who presents to the emergency department C/O ***    08/20/2024 08/20/2024 Symptoms are subacromial bursitis in nature with possible early capsulitis. No focal diffuse rotator cuff weakness or deficit. If symptoms do not improve we will consider bursal injection for diagnostic purposes to exclude referred pain considering previous ROMAN 20 years ago.      Today's prescribed/over-the-counter medications were discussed including its use, short and long-term indications, sequelae and side effects on current health status and importance of PCP follow-up to prevent undesired condition.      Lengthy discussion on the etiology and biomechanics of the patient's pathology/current condition. Reviewed conservative and surgical options as well as medication use in detail including current and previous imaging and x-rays. All risks/benefits/complications discussed. All questions answered. [email or web address] Not available 08/20/2024 10:02:13   10/02/2024 10/02/2024 Impression:  54-year-old female with left shoulder pain, suspected rotator cuff involvement    Plan:  Continue with home exercises to prevent stiffness and avoid frozen shoulder. Discontinue ineffective physical therapy. Prescribe Tramadol to manage pain, cautioning about its mild narcotic effects. Follow-up after MRI results to adjust treatment plan accordingly.      Lengthy discussion on the etiology and biomechanics of the patient's pathology/current condition. Reviewed conservative and surgical options as well as medication use in detail including current and previous imaging and x-rays. All risks/benefits/complications discussed.

## 2025-01-02 NOTE — ED TRIAGE NOTES
Pt states that she had a knee operation about a year ago, and states she feels like it has been giving out lately. Pt states it gave out today, and is now hurting and having left shoulder pain.

## 2025-01-09 ENCOUNTER — OFFICE VISIT (OUTPATIENT)
Age: 55
End: 2025-01-09
Payer: MEDICAID

## 2025-01-09 DIAGNOSIS — M25.561 RIGHT KNEE PAIN, UNSPECIFIED CHRONICITY: ICD-10-CM

## 2025-01-09 DIAGNOSIS — M25.511 RIGHT SHOULDER PAIN, UNSPECIFIED CHRONICITY: Primary | ICD-10-CM

## 2025-01-09 PROCEDURE — 99203 OFFICE O/P NEW LOW 30 MIN: CPT | Performed by: ORTHOPAEDIC SURGERY

## 2025-01-27 ENCOUNTER — HOSPITAL ENCOUNTER (OUTPATIENT)
Age: 55
Discharge: HOME | End: 2025-01-30
Attending: ORTHOPAEDIC SURGERY
Payer: MEDICAID

## 2025-01-27 DIAGNOSIS — M25.511 RIGHT SHOULDER PAIN, UNSPECIFIED CHRONICITY: ICD-10-CM

## 2025-01-27 DIAGNOSIS — M25.561 RIGHT KNEE PAIN, UNSPECIFIED CHRONICITY: ICD-10-CM

## 2025-01-27 DIAGNOSIS — M25.512 LEFT SHOULDER PAIN, UNSPECIFIED CHRONICITY: Primary | ICD-10-CM

## 2025-01-27 PROCEDURE — 73221 MRI JOINT UPR EXTREM W/O DYE: CPT

## 2025-01-27 PROCEDURE — 73721 MRI JNT OF LWR EXTRE W/O DYE: CPT

## 2025-02-03 ENCOUNTER — HOSPITAL ENCOUNTER (OUTPATIENT)
Age: 55
Setting detail: SPECIMEN
Discharge: HOME | End: 2025-02-06

## 2025-02-03 ENCOUNTER — OFFICE VISIT (OUTPATIENT)
Age: 55
End: 2025-02-03
Payer: MEDICAID

## 2025-02-03 DIAGNOSIS — S83.261D PERIPHERAL TEAR OF LATERAL MENISCUS OF RIGHT KNEE AS CURRENT INJURY, SUBSEQUENT ENCOUNTER: ICD-10-CM

## 2025-02-03 DIAGNOSIS — M25.561 RIGHT KNEE PAIN, UNSPECIFIED CHRONICITY: Primary | ICD-10-CM

## 2025-02-03 DIAGNOSIS — M75.52 BURSITIS OF LEFT SHOULDER: ICD-10-CM

## 2025-02-03 DIAGNOSIS — Z01.818 PRE-OP TESTING: ICD-10-CM

## 2025-02-03 LAB — LABCORP DRAW FEE: NORMAL

## 2025-02-03 PROCEDURE — 99214 OFFICE O/P EST MOD 30 MIN: CPT | Performed by: ORTHOPAEDIC SURGERY

## 2025-02-03 PROCEDURE — 99001 SPECIMEN HANDLING PT-LAB: CPT

## 2025-02-03 NOTE — PROGRESS NOTES
Name: Tere Cardona    : 1970     [facility]  [address], [address]  [facility] [address]  Dept: [phone]  Dept Fax: [phone]     Chief Complaint   Patient presents with    Shoulder Pain     Left    Knee Pain     Right        There were no vitals taken for this visit.     Allergies   Allergen Reactions    Levofloxacin Nausea And Vomiting and Rash     Other Reaction(s): Not available        Current Outpatient Medications   Medication Sig Dispense Refill    ibuprofen (IBU) 600 MG tablet Take 1 tablet by mouth every 6 hours as needed for Pain 120 tablet 0    fexofenadine-pseudoephedrine (ALLEGRA-D 12HR)  MG per extended release tablet Take 1 tablet by mouth 2 times daily 20 tablet 0    acetaminophen (TYLENOL) 500 MG tablet Take 2 tablets by mouth 4 times daily as needed for Pain 30 tablet 0    fluticasone (FLONASE) 50 MCG/ACT nasal spray 2 sprays by Each Nostril route daily 16 g 0    albuterol sulfate HFA (VENTOLIN HFA) 108 (90 Base) MCG/ACT inhaler Inhale 2 puffs into the lungs 4 times daily as needed for Wheezing 18 g 0    doxycycline hyclate (VIBRAMYCIN) 100 MG capsule Take 1 capsule by mouth 2 times daily (Patient not taking: Reported on 2024)      citalopram (CELEXA) 10 MG tablet Take 1 tablet by mouth daily      metoprolol tartrate (LOPRESSOR) 25 MG tablet Take 1 tablet by mouth daily      metFORMIN (GLUCOPHAGE) 500 MG tablet Take 1 tablet by mouth daily      pantoprazole (PROTONIX) 40 MG tablet Take 1 tablet by mouth daily      zolpidem (AMBIEN) 10 MG tablet Take 1 tablet by mouth.       No current facility-administered medications for this visit.       Patient Active Problem List   Diagnosis    Gastroesophageal reflux disease without esophagitis    Lumbosacral spondylosis without myelopathy    Pain in joint, multiple sites    Lumbago    Cervicalgia    Encounter for long-term

## 2025-02-03 NOTE — PATIENT INSTRUCTIONS
Meniscus Tear: Care Instructions  Overview     The meniscus is rubbery tissue in the knee that acts as a shock absorber between the upper and lower leg bones. The meniscus also keeps your knee stable by spreading weight across it. Each knee has two menisci (plural of meniscus). You can tear a meniscus if you plant your foot and twist, or pivot. The meniscus also can wear down as you age, and it can tear from squatting or kneeling.  Some tears may feel better on their own with home care and rehab. For others, your doctor may recommend surgery to repair it or to remove part of the meniscus. Your doctor may want you to see a doctor who specializes in bones and sports injuries.  Follow-up care is a key part of your treatment and safety. Be sure to make and go to all appointments, and call your doctor if you are having problems. It's also a good idea to know your test results and keep a list of the medicines you take.  How can you care for yourself at home?  Rest your knee when possible.  Do not squat or kneel.  Take pain medicines exactly as directed.  If the doctor gave you a prescription medicine for pain, take it as prescribed.  If you are not taking a prescription pain medicine, ask your doctor if you can take an over-the-counter medicine.  Put ice or a cold pack on your knee for 10 to 20 minutes at a time. Try to do this every 1 to 2 hours for the next 3 days (when you are awake) or until the swelling goes down. Put a thin cloth between the ice and your skin.  Prop up the sore leg on a pillow when you ice your knee or any time you sit or lie down during the next 3 days. Try to keep your leg above the level of your heart. This will help reduce swelling.  Follow your doctor's directions for using crutches or a knee brace, if suggested.  Follow your doctor's directions for exercises to keep your knee mobile and your leg muscles strong. Here are a few exercises you can try if your doctor says it is okay.  Quad sets:

## 2025-02-04 ENCOUNTER — HOSPITAL ENCOUNTER (EMERGENCY)
Age: 55
Discharge: HOME | End: 2025-02-04
Attending: FAMILY MEDICINE
Payer: MEDICAID

## 2025-02-04 VITALS
TEMPERATURE: 98.5 F | SYSTOLIC BLOOD PRESSURE: 135 MMHG | RESPIRATION RATE: 18 BRPM | HEIGHT: 64 IN | DIASTOLIC BLOOD PRESSURE: 62 MMHG | HEART RATE: 85 BPM | WEIGHT: 167 LBS | OXYGEN SATURATION: 97 % | BODY MASS INDEX: 28.51 KG/M2

## 2025-02-04 DIAGNOSIS — M23.251 DERANGEMENT OF POSTERIOR HORN OF LATERAL MENISCUS OF RIGHT KNEE DUE TO OLD INJURY: ICD-10-CM

## 2025-02-04 DIAGNOSIS — J06.9 VIRAL UPPER RESPIRATORY TRACT INFECTION WITH COUGH: Primary | ICD-10-CM

## 2025-02-04 PROCEDURE — 99282 EMERGENCY DEPT VISIT SF MDM: CPT

## 2025-02-04 ASSESSMENT — PAIN - FUNCTIONAL ASSESSMENT: PAIN_FUNCTIONAL_ASSESSMENT: NONE - DENIES PAIN

## 2025-02-04 NOTE — ED TRIAGE NOTES
Patient is in today for cough, headache and body aches that she has had since Thursday. States she has had a fever as high as 101. She took Tylenol around 7am this morning. Current temp is 98.5.

## 2025-02-04 NOTE — DISCHARGE INSTRUCTIONS
As we spoke of high suspicion this is a viral infection such as influenza or COVID.  The treatment is the same.  Since has been going on since Thursday outside the window for any Paxlovid or Tamiflu treatment is the same however which includes alternating between Tylenol and ibuprofen every 4 hours for fever control and bodyaches.  Stay hydrated by drinking plenty of fluid stop taking Mucinex as this can make her cough worse.  Recommend some Delsym cough medication available over-the-counter.  Return if you start having increasing shortness of breath difficulties with breathing or if you have any other questions or concerns recommend following up with your primary care provider since has been going on for 5 days you are probably almost over the hump.  You might be at the midway point.
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
## 2025-02-04 NOTE — DISCHARGE INSTR - COC
Continuity of Care Form    Patient Name: Tere Cardona   :  1970  MRN:  [id number]    Admit date:  2025  Discharge date:  ***    Code Status Order: No Order   Advance Directives:   Advance Care Flowsheet Documentation             Admitting Physician:  No admitting provider for patient encounter.  PCP: Andreia Baxter MD    Discharging Nurse: ***  Discharging Hospital Unit/Room#: ER09/09  Discharging Unit Phone Number: ***    Emergency Contact:   Extended Emergency Contact Information  Primary Emergency Contact: Rufino Cadet  Address: [address] [place] [address] of [place]  Home Phone: [phone]  Mobile Phone: [phone]  Relation: Child    Past Surgical History:  Past Surgical History:   Procedure Laterality Date    CYSTOSCOPY      HYSTERECTOMY (CERVIX STATUS UNKNOWN)  ??    [facility]    NEUROLOGICAL SURGERY  Aug 2005    Decompression for Arnold Chiari Syndrome    OVARY REMOVAL      TONSILLECTOMY      UROLOGICAL SURGERY      Shunt in kidney       Immunization History:     There is no immunization history on file for this patient.    Active Problems:  Patient Active Problem List   Diagnosis Code    Gastroesophageal reflux disease without esophagitis K21.9    Lumbosacral spondylosis without myelopathy M47.817    Pain in joint, multiple sites M25.50    Lumbago M54.50    Cervicalgia M54.2    Encounter for long-term (current) use of other medications Z79.899    Paresthesia R20.2    Other syndromes affecting cervical region M53.1    Osteoarthritis M19.90    Intractable migraine G43.919    Headache(784.0) R51.9    Derangement of posterior horn of lateral meniscus of right knee due to old injury M23.251       Isolation/Infection:   Isolation            No Isolation          Patient Infection Status       None to display                     Nurse Assessment:  Last Vital Signs: BP (!) 154/57   Pulse 85   Temp 98.5 °F (36.9 °C) (Oral)   Resp 18   Ht 1.626 m (5'

## 2025-02-09 ENCOUNTER — HOSPITAL ENCOUNTER (EMERGENCY)
Age: 55
Discharge: HOME | End: 2025-02-09
Attending: FAMILY MEDICINE
Payer: MEDICAID

## 2025-02-09 VITALS
DIASTOLIC BLOOD PRESSURE: 68 MMHG | HEART RATE: 100 BPM | OXYGEN SATURATION: 94 % | BODY MASS INDEX: 28.51 KG/M2 | TEMPERATURE: 99.4 F | HEIGHT: 64 IN | WEIGHT: 167 LBS | SYSTOLIC BLOOD PRESSURE: 121 MMHG | RESPIRATION RATE: 20 BRPM

## 2025-02-09 DIAGNOSIS — R05.1 ACUTE COUGH: ICD-10-CM

## 2025-02-09 DIAGNOSIS — J20.9 ACUTE BRONCHITIS, UNSPECIFIED ORGANISM: Primary | ICD-10-CM

## 2025-02-09 PROCEDURE — 6360000002 HC RX W HCPCS: Performed by: FAMILY MEDICINE

## 2025-02-09 PROCEDURE — 6370000000 HC RX 637 (ALT 250 FOR IP): Performed by: FAMILY MEDICINE

## 2025-02-09 PROCEDURE — 99284 EMERGENCY DEPT VISIT MOD MDM: CPT

## 2025-02-09 PROCEDURE — 96372 THER/PROPH/DIAG INJ SC/IM: CPT

## 2025-02-09 RX ORDER — BENZONATATE 100 MG/1
200 CAPSULE ORAL
Status: COMPLETED | OUTPATIENT
Start: 2025-02-09 | End: 2025-02-09

## 2025-02-09 RX ORDER — AMOXICILLIN 250 MG/1
1000 CAPSULE ORAL
Status: COMPLETED | OUTPATIENT
Start: 2025-02-09 | End: 2025-02-09

## 2025-02-09 RX ORDER — AMOXICILLIN 500 MG/1
1000 CAPSULE ORAL 3 TIMES DAILY
Qty: 30 CAPSULE | Refills: 0 | Status: SHIPPED | OUTPATIENT
Start: 2025-02-09 | End: 2025-02-14

## 2025-02-09 RX ORDER — CODEINE PHOSPHATE AND GUAIFENESIN 10; 100 MG/5ML; MG/5ML
10 SOLUTION ORAL
Status: COMPLETED | OUTPATIENT
Start: 2025-02-09 | End: 2025-02-09

## 2025-02-09 RX ORDER — BENZONATATE 200 MG/1
200 CAPSULE ORAL 3 TIMES DAILY PRN
Qty: 30 CAPSULE | Refills: 0 | Status: SHIPPED | OUTPATIENT
Start: 2025-02-09 | End: 2025-02-19

## 2025-02-09 RX ORDER — KETOROLAC TROMETHAMINE 30 MG/ML
30 INJECTION, SOLUTION INTRAMUSCULAR; INTRAVENOUS
Status: COMPLETED | OUTPATIENT
Start: 2025-02-09 | End: 2025-02-09

## 2025-02-09 RX ORDER — CODEINE PHOSPHATE AND GUAIFENESIN 10; 100 MG/5ML; MG/5ML
10 SOLUTION ORAL
Qty: 50 ML | Refills: 0 | Status: SHIPPED | OUTPATIENT
Start: 2025-02-09 | End: 2025-02-14

## 2025-02-09 RX ADMIN — GUAIFENESIN AND CODEINE PHOSPHATE 10 ML: 100; 10 SOLUTION ORAL at 05:55

## 2025-02-09 RX ADMIN — BENZONATATE 200 MG: 100 CAPSULE ORAL at 05:52

## 2025-02-09 RX ADMIN — KETOROLAC TROMETHAMINE 30 MG: 30 INJECTION, SOLUTION INTRAMUSCULAR at 05:56

## 2025-02-09 RX ADMIN — AMOXICILLIN 1000 MG: 250 CAPSULE ORAL at 05:52

## 2025-02-09 ASSESSMENT — PAIN SCALES - GENERAL
PAINLEVEL_OUTOF10: 10
PAINLEVEL_OUTOF10: 10

## 2025-02-09 ASSESSMENT — PAIN DESCRIPTION - LOCATION
LOCATION: CHEST;BACK
LOCATION: CHEST;BACK

## 2025-02-09 ASSESSMENT — PAIN DESCRIPTION - DESCRIPTORS
DESCRIPTORS: ACHING
DESCRIPTORS: ACHING

## 2025-02-09 ASSESSMENT — PAIN DESCRIPTION - FREQUENCY: FREQUENCY: CONTINUOUS

## 2025-02-09 ASSESSMENT — PAIN DESCRIPTION - ORIENTATION: ORIENTATION: LOWER

## 2025-02-09 ASSESSMENT — PAIN - FUNCTIONAL ASSESSMENT: PAIN_FUNCTIONAL_ASSESSMENT: 0-10

## 2025-02-09 NOTE — DISCHARGE INSTRUCTIONS
Take medication as prescribed. Continue to used Delsym. Alternate Tylenol and Motrin every 3 hours for pain. Add Mucinex to help with secretions.  Follow-up with your primary doctor.  Return to the ED for new or worsening symptoms

## 2025-02-09 NOTE — ED NOTES
I have reviewed discharge instructions with the patient and spouse.  The patient and spouse verbalized understanding.           Reviewed medication compliance, follow up with PCP, return to ER for any new or worrisome concerns

## 2025-02-09 NOTE — ED TRIAGE NOTES
Reports seen in ER last week diagnosed with COVID/FLU has been taking Delsym, Tylenol and Ibuprofen as directed. Reports feels no better has congested cough, unable to cough anything up. Feels like needs to cough it up but can't.  Pain in chest when coughing, pain in lower back. Some nausea no vomiting or diarrhea. Thinks she has had fever but did not check it. Decreased appetite.

## 2025-02-09 NOTE — ED PROVIDER NOTES
tablet Take 1 tablet by mouth daily      pantoprazole (PROTONIX) 40 MG tablet Take 1 tablet by mouth daily      zolpidem (AMBIEN) 10 MG tablet Take 1 tablet by mouth.             ALLERGIES     Levofloxacin    FAMILY HISTORY       Family History   Problem Relation Age of Onset    Hypertension Mother     Diabetes Mother     Headache Mother     Prostate Cancer Father     Lung Cancer Father     Headache Father     Asthma Father     Diabetes Father     Hypertension Father     Breast Cancer Maternal Aunt 60        60s and passed away age 68    Cancer Maternal Aunt     Breast Cancer Paternal Aunt           SOCIAL HISTORY       Social History     Socioeconomic History    Marital status: Single     Spouse name: None    Number of children: None    Years of education: None    Highest education level: None   Tobacco Use    Smoking status: Never    Smokeless tobacco: Never   Vaping Use    Vaping status: Never Used   Substance and Sexual Activity    Alcohol use: Not Currently    Drug use: Never   Social History Narrative    ** Merged History Encounter **            SCREENINGS                         Neha Coma Scale  Eye Opening: Spontaneous  Best Verbal Response: Oriented  Best Motor Response: Obeys commands  [place] Coma Scale Score: 15                     CIWA Assessment  BP: 135/63  Pulse: 100                 PHYSICAL EXAM       ED Triage Vitals [02/09/25 0521]   BP Systolic BP Percentile Diastolic BP Percentile Temp Temp Source Pulse Respirations SpO2   135/63 -- -- 99.4 °F (37.4 °C) Oral 100 20 96 %      Height Weight - Scale         1.626 m (5' 4\") 75.8 kg (167 lb)             Physical Exam  Vitals and nursing note reviewed.   Constitutional:       General: She is not in acute distress.     Appearance: Normal appearance. She is not ill-appearing, toxic-appearing or diaphoretic.      Comments: Looks fatigued   HENT:      Head: Normocephalic and atraumatic.      Right Ear: External ear normal.      Left Ear: External ear

## 2025-02-10 ASSESSMENT — ENCOUNTER SYMPTOMS
BACK PAIN: 1
GASTROINTESTINAL NEGATIVE: 1
COUGH: 1

## 2025-02-11 ENCOUNTER — APPOINTMENT (OUTPATIENT)
Age: 55
End: 2025-02-11
Payer: MEDICAID

## 2025-02-11 ENCOUNTER — HOSPITAL ENCOUNTER (EMERGENCY)
Age: 55
Discharge: HOME | End: 2025-02-11
Attending: FAMILY MEDICINE
Payer: MEDICAID

## 2025-02-11 VITALS
DIASTOLIC BLOOD PRESSURE: 75 MMHG | HEIGHT: 64 IN | HEART RATE: 85 BPM | RESPIRATION RATE: 18 BRPM | OXYGEN SATURATION: 96 % | WEIGHT: 167 LBS | SYSTOLIC BLOOD PRESSURE: 130 MMHG | TEMPERATURE: 98.2 F | BODY MASS INDEX: 28.51 KG/M2

## 2025-02-11 DIAGNOSIS — J40 BRONCHITIS: Primary | ICD-10-CM

## 2025-02-11 DIAGNOSIS — R51.9 ACUTE NONINTRACTABLE HEADACHE, UNSPECIFIED HEADACHE TYPE: ICD-10-CM

## 2025-02-11 DIAGNOSIS — R06.2 WHEEZE: ICD-10-CM

## 2025-02-11 PROCEDURE — 99283 EMERGENCY DEPT VISIT LOW MDM: CPT

## 2025-02-11 PROCEDURE — 6370000000 HC RX 637 (ALT 250 FOR IP): Performed by: FAMILY MEDICINE

## 2025-02-11 PROCEDURE — 94640 AIRWAY INHALATION TREATMENT: CPT

## 2025-02-11 PROCEDURE — 71046 X-RAY EXAM CHEST 2 VIEWS: CPT

## 2025-02-11 RX ORDER — ACETAMINOPHEN 500 MG
1000 TABLET ORAL
Status: COMPLETED | OUTPATIENT
Start: 2025-02-11 | End: 2025-02-11

## 2025-02-11 RX ORDER — ALBUTEROL SULFATE 90 UG/1
2 INHALANT RESPIRATORY (INHALATION) EVERY 4 HOURS PRN
Qty: 18 G | Refills: 3 | Status: SHIPPED | OUTPATIENT
Start: 2025-02-11

## 2025-02-11 RX ORDER — IPRATROPIUM BROMIDE AND ALBUTEROL SULFATE 2.5; .5 MG/3ML; MG/3ML
1 SOLUTION RESPIRATORY (INHALATION)
Status: COMPLETED | OUTPATIENT
Start: 2025-02-11 | End: 2025-02-11

## 2025-02-11 RX ADMIN — IPRATROPIUM BROMIDE AND ALBUTEROL SULFATE 1 DOSE: 2.5; .5 SOLUTION RESPIRATORY (INHALATION) at 07:58

## 2025-02-11 RX ADMIN — ACETAMINOPHEN 1000 MG: 500 TABLET ORAL at 08:26

## 2025-02-11 ASSESSMENT — PAIN SCALES - GENERAL: PAINLEVEL_OUTOF10: 10

## 2025-02-11 ASSESSMENT — PAIN DESCRIPTION - LOCATION: LOCATION: RIB CAGE

## 2025-02-11 ASSESSMENT — PAIN - FUNCTIONAL ASSESSMENT: PAIN_FUNCTIONAL_ASSESSMENT: 0-10

## 2025-02-11 NOTE — ED TRIAGE NOTES
Pt states this is her 3rd or 4th visit to the ER for a cough, states she is still not getting any better. States she still has pain when she laughs. States she has been taking the antibiotic.

## 2025-02-11 NOTE — PROGRESS NOTES
Breathing tx was given, no resp distress, slight exp wheezes after tx dwayne LLL, and fine crackles, No resp distress noted. Hr 85, RR 18 and sats 99%.    02/11/25 0758   Treatment   Treatment Type N   $Treatment Type $Inhaled Therapy/Meds   Medications Albuterol/Ipratropium   Pre-Tx Pulse 85   Pre-Tx Resps 18   Breath Sounds Pre-Tx BLAS Diminished;Fine crackles   Breath Sounds Pre-Tx LLL Diminished;Fine crackles   Breath Sounds Pre-Tx RUL Diminished;Fine crackles   Breath Sounds Pre-Tx RML Diminished;Fine crackles   Breath Sounds Pre-Tx RLL Diminished;Fine crackles   Delivery Source Mouthpiece   Position Semi-Cisneros's   Treatment Tolerance Well   Duration 10   Is patient on O2? N   Oxygen Therapy/Pulse Ox   O2 Therapy Room air   O2 Device None (Room air)   Pulse 85   Respirations 18   Pulse Oximeter Device Mode Continuous
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
## 2025-02-11 NOTE — DISCHARGE INSTRUCTIONS
As we spoke your vital signs are stable, you were diagnosed with bronchitis yesterday.  This could take several days to several weeks to improve.  Continue with the medicine written to you yesterday.  I have given you an inhaler.  Use 2 puffs every 4 hours.  Follow-up with your primary care provider.  Alternate between Tylenol and ibuprofen for any headaches for fever control body aches.

## 2025-02-17 ENCOUNTER — HOSPITAL ENCOUNTER (OUTPATIENT)
Age: 55
Discharge: HOME | End: 2025-02-20
Payer: MEDICAID

## 2025-02-17 ENCOUNTER — HOSPITAL ENCOUNTER (OUTPATIENT)
Age: 55
Discharge: HOME | End: 2025-02-20

## 2025-02-17 DIAGNOSIS — Z01.818 PRE-OP TESTING: ICD-10-CM

## 2025-02-17 DIAGNOSIS — S83.261D PERIPHERAL TEAR OF LATERAL MENISCUS OF RIGHT KNEE AS CURRENT INJURY, SUBSEQUENT ENCOUNTER: ICD-10-CM

## 2025-02-17 LAB — LABCORP DRAW FEE: NORMAL

## 2025-02-17 PROCEDURE — 99001 SPECIMEN HANDLING PT-LAB: CPT

## 2025-02-17 PROCEDURE — 71046 X-RAY EXAM CHEST 2 VIEWS: CPT

## 2025-02-17 PROCEDURE — 93005 ELECTROCARDIOGRAM TRACING: CPT

## 2025-02-18 LAB
BUN SERPL-MCNC: 12 MG/DL (ref 6–24)
BUN/CREAT SERPL: 13 (ref 9–23)
CALCIUM SERPL-MCNC: 9.7 MG/DL (ref 8.7–10.2)
CHLORIDE SERPL-SCNC: 106 MMOL/L (ref 96–106)
CO2 SERPL-SCNC: 19 MMOL/L (ref 20–29)
CREAT SERPL-MCNC: 0.95 MG/DL (ref 0.57–1)
EGFRCR SERPLBLD CKD-EPI 2021: 72 ML/MIN/1.73
EKG ATRIAL RATE: 75 BPM
EKG DIAGNOSIS: NORMAL
EKG P AXIS: 26 DEGREES
EKG P-R INTERVAL: 172 MS
EKG Q-T INTERVAL: 396 MS
EKG QRS DURATION: 72 MS
EKG QTC CALCULATION (BAZETT): 442 MS
EKG R AXIS: 34 DEGREES
EKG T AXIS: 39 DEGREES
EKG VENTRICULAR RATE: 75 BPM
ERYTHROCYTE [DISTWIDTH] IN BLOOD BY AUTOMATED COUNT: 12.4 % (ref 11.7–15.4)
GLUCOSE SERPL-MCNC: 106 MG/DL (ref 70–99)
HCT VFR BLD AUTO: 38.6 % (ref 34–46.6)
HGB BLD-MCNC: 12.7 G/DL (ref 11.1–15.9)
MCH RBC QN AUTO: 29.9 PG (ref 26.6–33)
MCHC RBC AUTO-ENTMCNC: 32.9 G/DL (ref 31.5–35.7)
MCV RBC AUTO: 91 FL (ref 79–97)
PLATELET # BLD AUTO: 270 X10E3/UL (ref 150–450)
POTASSIUM SERPL-SCNC: 4.2 MMOL/L (ref 3.5–5.2)
RBC # BLD AUTO: 4.25 X10E6/UL (ref 3.77–5.28)
SODIUM SERPL-SCNC: 143 MMOL/L (ref 134–144)
WBC # BLD AUTO: 4 X10E3/UL (ref 3.4–10.8)

## 2025-03-04 ENCOUNTER — OFFICE VISIT (OUTPATIENT)
Age: 55
End: 2025-03-04
Payer: MEDICAID

## 2025-03-04 DIAGNOSIS — G89.29 CHRONIC LEFT SHOULDER PAIN: ICD-10-CM

## 2025-03-04 DIAGNOSIS — M54.2 NECK PAIN: Primary | ICD-10-CM

## 2025-03-04 DIAGNOSIS — M25.561 RIGHT KNEE PAIN, UNSPECIFIED CHRONICITY: ICD-10-CM

## 2025-03-04 DIAGNOSIS — M25.512 CHRONIC LEFT SHOULDER PAIN: ICD-10-CM

## 2025-03-04 DIAGNOSIS — S83.261D PERIPHERAL TEAR OF LATERAL MENISCUS OF RIGHT KNEE AS CURRENT INJURY, SUBSEQUENT ENCOUNTER: ICD-10-CM

## 2025-03-04 PROCEDURE — 99213 OFFICE O/P EST LOW 20 MIN: CPT

## 2025-04-10 ENCOUNTER — HOSPITAL ENCOUNTER (OUTPATIENT)
Age: 55
Setting detail: RECURRING SERIES
Discharge: HOME | End: 2025-04-13
Payer: MEDICAID

## 2025-04-10 PROCEDURE — 97110 THERAPEUTIC EXERCISES: CPT

## 2025-04-10 PROCEDURE — 97016 VASOPNEUMATIC DEVICE THERAPY: CPT

## 2025-04-10 PROCEDURE — 97161 PT EVAL LOW COMPLEX 20 MIN: CPT
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
## 2025-04-10 NOTE — THERAPY EVALUATION
[facility] REHABILITATION  PHYSICAL THERAPY  [facility], [address] [place]  [address]  Phone: [phone]    Fax: [phone]     PLAN OF CARE / STATEMENT OF MEDICAL NECESSITY FOR PHYSICAL THERAPY SERVICES  Patient Name: Tere Cardona : 1970   Medical   Diagnosis: Superior glenoid labrum lesion of left shoulder, initial encounter [S43.432A] Treatment Diagnosis: L shoulder pain   Onset Date: 4/3/25     Referral Source: Martin Spencer, Abdulkadir Start of Care (SOC): 4/10/2025   Prior Hospitalization: See medical history Provider #: [id number]   Prior Level of Function: Independent with pain   Comorbidities: See chart   Medications: Verified on Patient Summary List   The Plan of Care and following information is based on the information from the initial evaluation.   ==========================================================================================  Assessment / Functional Analysis:    Pt is a 54 y.o. year old  female who presents to outpatient clinic today s/p L shoulder arthroscopy including labral repair & biceps tenodesis 4/3/25 with arm in sling. Pt is LHD and presents today with impairments that include decreased L shoulder ROM, L UE weakness, forward head posture and pain limiting function. Pt educated on HEP to include self-stretching and AAROM. Ice encouraged and sling don/doff education provided. Pt will benefit from skilled PT intervention to target deficits in effort to maximize pain-free daily activity and restore L UE function as directed by surgeon & soft tissue healing.   ==========================================================================================  Eval Complexity: History: LOW Complexity : Zero comorbidities / personal factors that will impact the outcome / POCExam:MEDIUM Complexity : 3 Standardized tests and measures addressin body structure, function, activity limitation and / or participation in recreation

## 2025-04-14 ENCOUNTER — HOSPITAL ENCOUNTER (OUTPATIENT)
Age: 55
Setting detail: RECURRING SERIES
Discharge: HOME | End: 2025-04-17
Payer: MEDICAID

## 2025-04-14 PROCEDURE — 97110 THERAPEUTIC EXERCISES: CPT

## 2025-04-14 PROCEDURE — 97016 VASOPNEUMATIC DEVICE THERAPY: CPT

## 2025-04-14 NOTE — PROGRESS NOTES
PT DAILY TREATMENT NOTE     Patient Name: Tere Cardona  Date:2025  : 1970  [x]  Patient  Verified  Payor: [facility] MEDICAID / Plan: JD [facility] HEALTHKEEPERS PLUS / Product Type: *No Product type* /    In time:08  Out time:08  Total Treatment Time (min): 42  Total Timed Codes (min): 32  1:1 Treatment Time (min): 32   Visit # 2      Treatment Area: Superior glenoid labrum lesion of left shoulder, initial encounter [S43.432A]    SUBJECTIVE  Pt arrives with arm in sling, no new complaints.   Pain Level (0-10 scale): 7/10  Any medication changes, allergies to medications, adverse drug reactions, diagnosis change, or new procedure performed?: [x] No    [] Yes (see summary sheet for update)        OBJECTIVE  Modality rationale: decrease edema and decrease pain to improve the patient’s ability to move/heal optimally    Min Type Additional Details    [] Estim: []Att   []Unatt  []TENS instruct                 []IFC  []Premod []NMES                       []Other:  []w/US   []w/ice   []w/heat  Position:  Location:    []  Traction: [] Cervical       []Lumbar                       [] Prone          []Supine                       []Intermittent   []Continuous Lbs:  [] before manual  [] after manual    []  Ultrasound: []Continuous   [] Pulsed                           []1MHz   []3MHz Location:  W/cm2:    []  Iontophoresis with dexamethasone         Location: [] Take home patch   [] In clinic    []  Ice     []  heat  []  Ice massage Position:  Location:   10 [x]  Vasopneumatic Device Pressure: [x] lo [] med [] hi   Temp: [x] lo [] med [] hi   [x] Skin assessment post-treatment:  [x]intact []redness- no adverse reaction       []redness - adverse reaction:           32 min Therapeutic Exercise:  [x] See flow sheet :   Rationale: increase ROM, increase strength, and increase proprioception to improve the patient’s ability to maximize pain-free daily activity and restore L UE function as directed by

## 2025-04-17 ENCOUNTER — HOSPITAL ENCOUNTER (OUTPATIENT)
Age: 55
Setting detail: RECURRING SERIES
End: 2025-04-17
Payer: MEDICAID

## 2025-04-22 ENCOUNTER — HOSPITAL ENCOUNTER (OUTPATIENT)
Age: 55
Setting detail: RECURRING SERIES
Discharge: HOME | End: 2025-04-25
Payer: MEDICAID

## 2025-04-22 PROCEDURE — 97110 THERAPEUTIC EXERCISES: CPT

## 2025-04-22 PROCEDURE — 97016 VASOPNEUMATIC DEVICE THERAPY: CPT

## 2025-04-22 NOTE — PROGRESS NOTES
PT DAILY TREATMENT NOTE     Patient Name: Tere Cardona  Date:2025  : 1970  [x]  Patient  Verified  Payor: [facility] MEDICAID / Plan: JD [facility] HEALTHKEEPERS PLUS / Product Type: *No Product type* /    In time:901  Out time:1001  Total Treatment Time (min): 60  Total Timed Codes (min): 50  1:1 Treatment Time (min): 30   Visit #: 3 of 8    Treatment Area: Superior glenoid labrum lesion of left shoulder, initial encounter [S43.432A]    SUBJECTIVE  Pt enters wearing sling and holding another. Pt says she intentionally did not take pain medication. She enters with pain, stiffness, tightness, and soreness in L shoulder.     Pain Level (0-10 scale): 10    Any medication changes, allergies to medications, adverse drug reactions, diagnosis change, or new procedure performed?: [x] No    [] Yes (see summary sheet for update)    OBJECTIVE  Modality rationale: decrease edema, decrease inflammation, decrease pain, and increase tissue extensibility to improve the patient’s ability to fully participate in rehab and post rehab activities.    Min Type Additional Details    [] Estim: []Att   []Unatt  []TENS instruct                 []IFC  []Premod   []NMES                       []Other:  []w/US   []w/ice   []w/heat  Position:  Location:    []  Traction: [] Cervical       []Lumbar                       [] Prone          []Supine                       []Intermittent   []Continuous Lbs:  [] before manual  [] after manual    []  Ultrasound: []Continuous   [] Pulsed                           []1MHz   []3MHz Location:  W/cm2:    []  Iontophoresis with dexamethasone         Location: [] Take home patch   [] In clinic   10 []  Ice     [x]  heat  []  Ice massage Position: seated  Location: L shoulder   10 [x]  Vasopneumatic Device Pressure: [] lo [] med [] hi   Temp: [] lo [] med [] hi   [] Skin assessment post-treatment:  []intact []redness- no adverse reaction       []redness - adverse reaction:     30

## 2025-04-24 ENCOUNTER — HOSPITAL ENCOUNTER (OUTPATIENT)
Age: 55
Setting detail: RECURRING SERIES
Discharge: HOME | End: 2025-04-27
Payer: MEDICAID

## 2025-04-24 PROCEDURE — 97016 VASOPNEUMATIC DEVICE THERAPY: CPT

## 2025-04-24 PROCEDURE — 97530 THERAPEUTIC ACTIVITIES: CPT

## 2025-04-24 PROCEDURE — 97110 THERAPEUTIC EXERCISES: CPT

## 2025-04-24 NOTE — PROGRESS NOTES
Physical Therapy  PT DAILY TREATMENT NOTE     Patient Name: Tere Cardona  Date:2025  : 1970  [x]  Patient  Verified  Payor: [facility] MEDICAID / Plan: JD [facility] HEALTHKEEPERS PLUS / Product Type: *No Product type* /    In time:958  Out time:1056  Total Treatment Time (min): 58  Total Timed Codes (min): 58  1:1 Treatment Time (min): 48   Visit #: 4 of 8    Treatment Area: Superior glenoid labrum lesion of left shoulder, initial encounter [S43.432A]    SUBJECTIVE  Patient states L shoulder pain 6/10 upon arrival today.    Pain Level (0-10 scale): 6/10    Any medication changes, allergies to medications, adverse drug reactions, diagnosis change, or new procedure performed?: [x] No    [] Yes (see summary sheet for update)        OBJECTIVE  Modality rationale: decrease edema, decrease inflammation, and decrease pain to improve the patient’s ability to decrease pain with ADL performance.     Min Type Additional Details    [] Estim: []Att   []Unatt  []TENS instruct                 []IFC  []Premod []NMES                       []Other:  []w/US   []w/ice   []w/heat  Position:  Location:    []  Traction: [] Cervical       []Lumbar                       [] Prone          []Supine                       []Intermittent   []Continuous Lbs:  [] before manual  [] after manual    []  Ultrasound: []Continuous   [] Pulsed                           []1MHz   []3MHz Location:  W/cm2:    []  Iontophoresis with dexamethasone         Location: [] Take home patch   [] In clinic    []  Ice     []  heat  []  Ice massage Position:  Location:   10 [x]  Vasopneumatic Device Pressure: [x] lo [] med [] hi   Temp: [] lo [x] med [] hi   [x] Skin assessment post-treatment:  [x]intact [x]redness- no adverse reaction       []redness - adverse reaction:       40 min Therapeutic Exercise:  [x] See flow sheet.   Rationale: increase ROM, increase strength, and improve coordination to improve the patient’s ability to increase

## 2025-04-29 ENCOUNTER — HOSPITAL ENCOUNTER (OUTPATIENT)
Age: 55
Setting detail: RECURRING SERIES
Discharge: HOME | End: 2025-05-02
Payer: MEDICAID

## 2025-04-29 PROCEDURE — 97110 THERAPEUTIC EXERCISES: CPT

## 2025-04-29 PROCEDURE — 97140 MANUAL THERAPY 1/> REGIONS: CPT

## 2025-04-29 PROCEDURE — 97016 VASOPNEUMATIC DEVICE THERAPY: CPT

## 2025-04-29 NOTE — PROGRESS NOTES
overlapped with another patient      33 min Therapeutic Exercise:  [x] See flow sheet :   Rationale: increase ROM, increase strength, and improve coordination to improve the patient’s ability to  maximize pain-free daily activity and restore L UE function as directed by surgeon & soft tissue healing.      min Therapeutic Activity:  []  See flow sheet :   Rationale:       min Neuromuscular Re-education:  []  See flow sheet :   Rationale:      10 min Manual Therapy:  PROM of the L shoulder   Rationale: decrease pain, increase ROM, and increase tissue extensibility to reduce muscle tension, improve ROM, in effort to achieve patient's full potential to return to normal activity.      min Gait Training:  ___ feet with ___ device on level surfaces with ___ level of assist   Rationale:           With TE  TA   NR  GT   Misc Patient Education: [x] Review HEP    [] Progressed/Changed HEP based on:   [] positioning   [] body mechanics   [] transfers   [] heat/ice application          Pain Level (0-10 scale) post treatment: 4/10    ASSESSMENT/Changes in Function: Session began with therapeutic stretches per flow sheet, requiring tactile cues for proper form to maximize benefits of stretches. Patient requires tactile cues during pendulums for safety & to prevent active use of L UE to perform. Patient performs elbow flexion, pronation & supination while supine with UE propped, focusing on increasing elbow ROM to prevent risk for contractures. PROM to L shoulder as documented above.  Vaso compression applied to L shoulder. Per last visit with difficulty with donning sling pt was observed for correct donning. Plan to continue with POC.    Patient will continue to benefit from skilled PT services to modify and progress therapeutic interventions, analyze and address functional mobility deficits, analyze and address ROM deficits, analyze and address strength deficits, analyze and address soft tissue restrictions, analyze and cue for

## 2025-05-01 ENCOUNTER — HOSPITAL ENCOUNTER (OUTPATIENT)
Age: 55
Setting detail: RECURRING SERIES
Discharge: HOME | End: 2025-05-04
Payer: MEDICAID

## 2025-05-01 PROCEDURE — 97110 THERAPEUTIC EXERCISES: CPT

## 2025-05-01 PROCEDURE — 97016 VASOPNEUMATIC DEVICE THERAPY: CPT

## 2025-05-01 NOTE — PROGRESS NOTES
PT DAILY TREATMENT NOTE     Patient Name: Tere Cardona  Date:2025  : 1970  [x]  Patient  Verified  Payor: [facility] MEDICAID / Plan: JD [facility] HEALTHKEEPERS PLUS / Product Type: *No Product type* /    In time:0900  Out time:0950  Total Treatment Time (min): 50  Total Timed Codes (min): 40  1:1 Treatment Time (min): 40   Visit # 6      Treatment Area: Superior glenoid labrum lesion of left shoulder, initial encounter [S43.721A]    SUBJECTIVE  Pt reports having sharp pain at center of deltoid that is interfering with all activity and sleep. Pt states that she is weaning out of sling but has started back driving short distances and also bumps into things often. Safety education discussed. Pt follows up with surgeon on Monday. Pt states that she is having bladder surgery next week and will be holding on PT until cleared to resume (1-2 weeks).   Pain Level (0-10 scale): 8/10  Any medication changes, allergies to medications, adverse drug reactions, diagnosis change, or new procedure performed?: [x] No    [] Yes (see summary sheet for update)        OBJECTIVE  Modality rationale: decrease edema, decrease inflammation, and decrease pain to improve the patient’s ability to move/heal optimally    Min Type Additional Details    [] Estim: []Att   []Unatt  []TENS instruct                 []IFC  []Premod []NMES                       []Other:  []w/US   []w/ice   []w/heat  Position:  Location:    []  Traction: [] Cervical       []Lumbar                       [] Prone          []Supine                       []Intermittent   []Continuous Lbs:  [] before manual  [] after manual    []  Ultrasound: []Continuous   [] Pulsed                           []1MHz   []3MHz Location:  W/cm2:    []  Iontophoresis with dexamethasone         Location: [] Take home patch   [] In clinic    []  Ice     []  heat  []  Ice massage Position:  Location:   10 [x]  Vasopneumatic Device Pressure: [x] lo [] med [] hi   Temp:

## 2025-05-20 ENCOUNTER — APPOINTMENT (OUTPATIENT)
Age: 55
End: 2025-05-20
Payer: MEDICAID

## 2025-05-20 ENCOUNTER — HOSPITAL ENCOUNTER (EMERGENCY)
Age: 55
Discharge: HOME | End: 2025-05-20
Attending: EMERGENCY MEDICINE
Payer: MEDICAID

## 2025-05-20 VITALS
SYSTOLIC BLOOD PRESSURE: 125 MMHG | HEART RATE: 81 BPM | OXYGEN SATURATION: 96 % | WEIGHT: 182.8 LBS | DIASTOLIC BLOOD PRESSURE: 69 MMHG | TEMPERATURE: 98.1 F | HEIGHT: 64 IN | RESPIRATION RATE: 16 BRPM | BODY MASS INDEX: 31.21 KG/M2

## 2025-05-20 DIAGNOSIS — M25.512 ACUTE PAIN OF LEFT SHOULDER: Primary | ICD-10-CM

## 2025-05-20 PROCEDURE — 6360000002 HC RX W HCPCS: Performed by: EMERGENCY MEDICINE

## 2025-05-20 PROCEDURE — 73030 X-RAY EXAM OF SHOULDER: CPT

## 2025-05-20 PROCEDURE — 96372 THER/PROPH/DIAG INJ SC/IM: CPT

## 2025-05-20 PROCEDURE — 99284 EMERGENCY DEPT VISIT MOD MDM: CPT

## 2025-05-20 RX ORDER — KETOROLAC TROMETHAMINE 30 MG/ML
30 INJECTION, SOLUTION INTRAMUSCULAR; INTRAVENOUS
Status: COMPLETED | OUTPATIENT
Start: 2025-05-20 | End: 2025-05-20

## 2025-05-20 RX ADMIN — KETOROLAC TROMETHAMINE 30 MG: 30 INJECTION, SOLUTION INTRAMUSCULAR at 04:25

## 2025-05-20 ASSESSMENT — PAIN SCALES - GENERAL
PAINLEVEL_OUTOF10: 5
PAINLEVEL_OUTOF10: 10

## 2025-05-20 ASSESSMENT — PAIN - FUNCTIONAL ASSESSMENT: PAIN_FUNCTIONAL_ASSESSMENT: 0-10

## 2025-05-20 ASSESSMENT — PAIN DESCRIPTION - ORIENTATION: ORIENTATION: LEFT

## 2025-05-20 ASSESSMENT — PAIN DESCRIPTION - LOCATION: LOCATION: SHOULDER

## 2025-05-20 NOTE — DISCHARGE INSTRUCTIONS
You were seen and evaluated for your left shoulder pain after the cell phone fell on it.  X-rays do not show any fracture or dislocation.  As we discussed, your shoulder mobility is significantly decreased.  You need to follow-up with your orthopedic surgeon as well as physical therapy to get this range of motion back.  Anti-inflammatories and ice.  Return the emergency room for worsening symptoms or any other concerns.

## 2025-05-20 NOTE — ED PROVIDER NOTES
Atraumatic; PERRL; Mouth oral mucosa moist  Neck: Supple  Cardiovascular: Regular rate and rhythm, no murmurs, rubs, or gallops  Chest: Normal work of breathing and chest excursion bilaterally  Lungs: Clear to ausculation bilaterally  Extremities:   Left shoulder reveals anterior tenderness.  Surgical scars are present without erythema or cellulitic changes.  Neurovascularly the left upper extremity is intact.  There is significant decrease of range of motion with abduction and external rotation of the left shoulder.  Skin: Warm and dry, normal cap refill  Neuro: Alert and appropriate, normal speech, strength symmetric bilaterally, normal coordination  Psychiatric: Normal mood and affect     Diagnostic Study Results     Labs:  No results found for this or any previous visit (from the past 12 hours).    Radiologic Studies:   XR SHOULDER LEFT (MIN 2 VIEWS)   Final Result   No acute abnormality.      Electronically signed by MANINDER DAVID          XR SHOULDER LEFT (MIN 2 VIEWS)  Result Date: 5/20/2025  EXAM: XR SHOULDER LEFT (MIN 2 VIEWS) INDICATION: anterior left shoulder pain. COMPARISON: 1/2/2025. FINDINGS: Three views of the left shoulder demonstrate no fracture, dislocation or other acute abnormality.     No acute abnormality. Electronically signed by MANINDER DAVID      Procedures     Procedures    ED Course     5:15 AM EDT I (Agustín David DO) am the first provider for this patient. Initial assessment performed. I reviewed the vital signs, available nursing notes, past medical history, past surgical history, family history and social history. The patients presenting problems have been discussed, and they are in agreement with the care plan formulated and outlined with them.  I have encouraged them to ask questions as they arise throughout their visit.    Records Reviewed: Nursing Notes and Old Medical Records    Is this patient to be included in the SEP-1 core measure? No Exclusion criteria - the patient is

## 2025-05-20 NOTE — ED TRIAGE NOTES
Pt. Complains of shoulder pain after her cell phone fell from her hand and hit her shoulder. Pt. States this happen 30 minutes ago. Pt. Had a bicep tendon procedure and arthroscopy to this shoulder/ arm back in April.

## 2025-05-30 ENCOUNTER — HOSPITAL ENCOUNTER (OUTPATIENT)
Age: 55
Setting detail: RECURRING SERIES
End: 2025-05-30
Payer: MEDICAID

## 2025-05-30 PROCEDURE — 97110 THERAPEUTIC EXERCISES: CPT

## 2025-05-30 PROCEDURE — 97140 MANUAL THERAPY 1/> REGIONS: CPT

## 2025-05-30 PROCEDURE — 97016 VASOPNEUMATIC DEVICE THERAPY: CPT

## 2025-05-30 PROCEDURE — 97164 PT RE-EVAL EST PLAN CARE: CPT

## 2025-05-30 NOTE — THERAPY RECERTIFICATION
[facility] REHABILITATION  PHYSICAL THERAPY  [facility], [address] [place]  [address]  Phone: [phone]    Fax: [phone]   PT RE-EVALUATION / UPDATED PLAN OF CARE for PHYSICAL THERAPY          Patient Name: Tere Cardona : 1970   Treatment/Medical Diagnosis: Superior glenoid labrum lesion of left shoulder, initial encounter [S43.432A]   Onset Date: 4/3/25    Referral Source: Martin Spencer, * Start of Care (SOC): 4/10/25   Prior Hospitalization: See Medical History Provider #: [id number]   Prior Level of Function: Independent with pain   Comorbidities: See chart   Medications: Verified on Patient Summary List   Visits from SOC: 7 Missed Visits: 0     Objective:   Palpation: tenderness noted grossly along L shoulder, UT and medical scapular border; incisions clean and dry, healing well      Posture: forward head , rounded shoulders     Shoulder ROM:  [] Unable to assess left AROM at this time due to surgical acuity                                                AROM                       PROM    Left Right   Left Right   Flexion  75 154   126*     Extension  60           Abduction  65 142   110*     ER  35  WFL         ER @ 15 Degrees L upper trap     50     IR @ 15 Degrees  IR  L Glut    T6 level   55     -AROM measured in sitting, PROM measured in supine  *indicative of pain     UE Strength:   [x] Unable to assess L UE at this time  due to surgical acuity                                                                          Left Right Pain   Flexion   4+ [] Yes   [] No   Abduction   4+ [] Yes   [] No   Extension   5 [] Yes   [] No   External Rotation   4+ [] Yes   [] No   Internal Rotation   5 [] Yes   [] No   Elbow Flexion   4+ [] Yes   [] No   Elbow Extension   4+ [] Yes   [] No         Other tests/comments: Quick DASH: 88.6%                      Short Term Goals: 6 weeks  Pt will be independent with HEP to improve L shoulder ROM & scapular

## 2025-05-30 NOTE — PROGRESS NOTES
PT DAILY TREATMENT NOTE     Patient Name: Tere Cardona  Date:2025  : 1970  [x]  Patient  Verified  Payor: [facility] MEDICAID / Plan: JD [facility] HEALTHKEEPERS PLUS / Product Type: *No Product type* /    In time:10:05  Out time: 11:10  Total Treatment Time (min): 65  Total Timed Codes (min): 39  1:1 Treatment Time (min): 55   Visit # 7 of 8      Treatment Area: Superior glenoid labrum lesion of left shoulder, initial encounter [S43.432A]    SUBJECTIVE  Pt reports that she feels stiff today.   Pain Level (0-10 scale): 8/10  Any medication changes, allergies to medications, adverse drug reactions, diagnosis change, or new procedure performed?: [x] No    [] Yes (see summary sheet for update)        OBJECTIVE  Modality rationale: decrease edema, decrease inflammation, and decrease pain to improve the patient’s ability to perform ADLs with ease.    Min Type Additional Details    [] Estim: []Att   []Unatt  []TENS instruct                 []IFC  []Premod []NMES                       []Other:  []w/US   []w/ice   []w/heat  Position:  Location:    []  Traction: [] Cervical       []Lumbar                       [] Prone          []Supine                       []Intermittent   []Continuous Lbs:  [] before manual  [] after manual    []  Ultrasound: []Continuous   [] Pulsed                           []1MHz   []3MHz Location:  W/cm2:    []  Iontophoresis with dexamethasone         Location: [] Take home patch   [] In clinic    []  Ice     []  heat  []  Ice massage Position:  Location:   10 [x]  Vasopneumatic Device Pressure: [x] lo [] med [] hi   Temp: [x] lo [] med [] hi   [] Skin assessment post-treatment:  []intact []redness- no adverse reaction       []redness - adverse reaction:           24 min Therapeutic Exercise:  [x] See flow sheet :   Rationale: increase ROM and increase strength to improve the patient’s ability to perform ADLs with ease.     16 min PT Re-eval:  Re-establish POC after

## 2025-06-04 ENCOUNTER — HOSPITAL ENCOUNTER (OUTPATIENT)
Age: 55
Setting detail: RECURRING SERIES
Discharge: HOME | End: 2025-06-07
Payer: MEDICAID

## 2025-06-04 PROCEDURE — 97140 MANUAL THERAPY 1/> REGIONS: CPT

## 2025-06-04 PROCEDURE — 97110 THERAPEUTIC EXERCISES: CPT

## 2025-06-04 NOTE — PROGRESS NOTES
PT DAILY TREATMENT NOTE 8    Patient Name: Tere Cardona  Date:2025  : 1970  [x]  Patient  Verified  Payor: [facility] MEDICAID / Plan: JD [facility] HEALTHKEEPERS PLUS / Product Type: *No Product type* /    In time:1502  Out time:1600  Total Treatment Time (min): 58  Total Timed Codes (min): 48  1:1 Treatment Time (min):  25  Visit #: 8 of 8    Treatment Area: Superior glenoid labrum lesion of left shoulder, initial encounter [S44.123A]    SUBJECTIVE  Pt states compliance with HEP. She reports stiffness and tightness in L shoulder.     Pain Level (0-10 scale): 6    Any medication changes, allergies to medications, adverse drug reactions, diagnosis change, or new procedure performed?: [x] No    [] Yes (see summary sheet for update)    OBJECTIVE  Modality rationale: decrease edema, decrease inflammation, and decrease pain to improve the patient’s ability to optimally heal.   Min Type Additional Details    [] Estim: []Att   []Unatt  []TENS instruct                 []IFC  []Premod   []NMES                       []Other:  []w/US   []w/ice   []w/heat  Position:  Location:    []  Traction: [] Cervical       []Lumbar                       [] Prone          []Supine                       []Intermittent   []Continuous Lbs:  [] before manual  [] after manual    []  Ultrasound: []Continuous   [] Pulsed                           []1MHz   []3MHz Location:  W/cm2:    []  Iontophoresis with dexamethasone         Location: [] Take home patch   [] In clinic   10 [x]  Ice     []  heat  []  Ice massage Position: seated  Location: L shoulder    []  Vasopneumatic Device Pressure: [] lo [] med [] hi   Temp: [] lo [] med [] hi   [] Skin assessment post-treatment:  []intact []redness- no adverse reaction       []redness - adverse reaction:     30 min Therapeutic Exercise:  [x] See flow sheet :   Rationale: increase ROM, increase strength, improve coordination, and increase proprioception to improve the

## 2025-06-06 ENCOUNTER — HOSPITAL ENCOUNTER (OUTPATIENT)
Age: 55
Setting detail: RECURRING SERIES
Discharge: HOME | End: 2025-06-09
Payer: MEDICAID

## 2025-06-06 ENCOUNTER — OFFICE VISIT (OUTPATIENT)
Age: 55
End: 2025-06-06
Payer: MEDICAID

## 2025-06-06 DIAGNOSIS — S83.261D PERIPHERAL TEAR OF LATERAL MENISCUS OF RIGHT KNEE AS CURRENT INJURY, SUBSEQUENT ENCOUNTER: Primary | ICD-10-CM

## 2025-06-06 PROCEDURE — 99214 OFFICE O/P EST MOD 30 MIN: CPT | Performed by: ORTHOPAEDIC SURGERY

## 2025-06-06 PROCEDURE — 97110 THERAPEUTIC EXERCISES: CPT

## 2025-06-06 PROCEDURE — 97140 MANUAL THERAPY 1/> REGIONS: CPT

## 2025-06-06 NOTE — PROGRESS NOTES
and increase proprioception to improve the patient’s ability to achieve full AROM    15 min Manual Therapy:  PROM to L shoulder   Rationale: decrease pain, increase ROM, and increase tissue extensibility      With TE  TA   NR  GT   Misc Patient Education: [x] Review HEP    [] Progressed/Changed HEP based on:   [] positioning   [] body mechanics   [] transfers   [] heat/ice application        Pain Level (0-10 scale) post treatment: 5    ASSESSMENT/Changes in Function: Continued with phase II protocol working to address range of motion, mobility and flexibility deficits. Revisited stretches and AAROM activities with patient reporting pain at end range. Scapular dyskinesis was present with all AAROM. Tactile and verbal cueing was provided for proper optimal muscle engagement. PROM performed in all planes to patients tolerance. Pt exhibit muscle guarding due to pain and discomfort. Pt has a follow up appointment with surgeon after PT session today. Plan to continue POC as able next visit.     Patient will continue to benefit from skilled PT services to modify and progress therapeutic interventions, analyze and address functional mobility deficits, analyze and address ROM deficits, analyze and address strength deficits, analyze and address soft tissue restrictions, analyze and cue for proper movement patterns, and analyze and modify for postural abnormalities to attain remaining goals.     [x]  See Plan of Care  []  See progress note/recertification  []  See Discharge Summary       PLAN  [x]  Upgrade activities as tolerated     [x]  Continue plan of care  []  Update interventions per flow sheet       []  Discharge due to:_  []  Other:_      ERIN Carmen  6/6/2025  8:10 AM

## 2025-06-06 NOTE — PROGRESS NOTES
encounter is not finalizing the scheduling of any type of surgery.  Rather further diagnostic workup and clearances/ancillary workup will be required prior to scheduling the surgery.  All those documents will be reviewed and then a final decision on a follow-up appointment will be made regarding proceeding with surgery.  Patient has been made aware.      Encounter Diagnosis   Name Primary?    Peripheral tear of lateral meniscus of right knee as current injury, subsequent encounter Yes      History of Present Illness  The patient presents for evaluation of right knee pain.    She reports discomfort in her right knee, previously scheduled for a procedure in 03/2025 but delayed due to insurance issues. She has undergone injections and physical therapy. She desires the procedure in 08/2025. No history of thromboembolic events and not under cardiologist care.    Results  Imaging   - MRI: Intact graft from previous surgery and small lateral meniscus tear.    Assessment & Plan  Right knee pain:    Recommend right knee arthroscopy, meniscectomy, and synovectomy for lateral meniscus tear and pain relief. Tentatively scheduled for 08/2025, pending insurance approval. Patient has received injections and therapy. Staff to provide further information and options.    As part of continued conservative pain management options the patient was advised to utilize Tylenol or OTC NSAIDS as long as it is not medically contraindicated.   Return to Office:    Follow-up and Dispositions    Return for SCHEDULE FOR SURGERY.        Scribed by Casa Ellis MD as dictated by Casa Ellis MD.  Documentation, performed by, True and Accepted Casa Ellis MD    The patient (or guardian, if applicable) and other individuals in attendance with the patient were advised that Artificial Intelligence will be utilized during this visit to record, process the conversation to generate a clinical note, and support improvement of the AI technology.

## 2025-06-09 ENCOUNTER — HOSPITAL ENCOUNTER (OUTPATIENT)
Age: 55
Setting detail: RECURRING SERIES
Discharge: HOME | End: 2025-06-12
Payer: MEDICAID

## 2025-06-09 PROCEDURE — 97110 THERAPEUTIC EXERCISES: CPT

## 2025-06-09 PROCEDURE — 97140 MANUAL THERAPY 1/> REGIONS: CPT

## 2025-06-09 NOTE — PROGRESS NOTES
PT DAILY TREATMENT NOTE 8    Patient Name: Tere Cardona  Date:2025  : 1970  [x]  Patient  Verified  Payor: [facility] MEDICAID / Plan: JD [facility] HEALTHKEEPERS PLUS / Product Type: *No Product type* /    In time:1011  Out time:1118  Total Treatment Time (min): 67  Total Timed Codes (min): 57  1:1 Treatment Time (min): 43   Visit #: 2 of 4    Treatment Area: Superior glenoid labrum lesion of left shoulder, initial encounter [S46.877P]    SUBJECTIVE  Pt reports compliance with HEP. She reports frustration with L shoulder progress. Pain medication was taken an hour before PT.    Pain Level (0-10 scale): 8    Any medication changes, allergies to medications, adverse drug reactions, diagnosis change, or new procedure performed?: [x] No    [] Yes (see summary sheet for update)    OBJECTIVE  Modality rationale: decrease edema, decrease inflammation, and decrease pain   Min Type Additional Details    [] Estim: []Att   []Unatt  []TENS instruct                 []IFC  []Premod   []NMES                       []Other:  []w/US   []w/ice   []w/heat  Position:  Location:    []  Traction: [] Cervical       []Lumbar                       [] Prone          []Supine                       []Intermittent   []Continuous Lbs:  [] before manual  [] after manual    []  Ultrasound: []Continuous   [] Pulsed                           []1MHz   []3MHz Location:  W/cm2:    []  Iontophoresis with dexamethasone         Location: [] Take home patch   [] In clinic   10 [x]  Ice     []  heat  []  Ice massage Position: seated  Location: L shoulder    []  Vasopneumatic Device Pressure: [] lo [] med [] hi   Temp: [] lo [] med [] hi   [] Skin assessment post-treatment:  []intact []redness- no adverse reaction       []redness - adverse reaction:     30 min Therapeutic Exercise:  [x] See flow sheet :   Rationale: increase ROM, increase strength, improve coordination, and increase proprioception to improve the patient’s

## 2025-06-12 ENCOUNTER — HOSPITAL ENCOUNTER (OUTPATIENT)
Age: 55
Setting detail: RECURRING SERIES
Discharge: HOME | End: 2025-06-15
Payer: MEDICAID

## 2025-06-12 PROCEDURE — 97140 MANUAL THERAPY 1/> REGIONS: CPT

## 2025-06-12 PROCEDURE — 97110 THERAPEUTIC EXERCISES: CPT

## 2025-06-12 NOTE — PROGRESS NOTES
PT DAILY TREATMENT NOTE     Patient Name: Tere Cardona  Date:2025  : 1970  [x]  Patient  Verified  Payor: [facility] MEDICAID / Plan: JD [facility] HEALTHKEEPERS PLUS / Product Type: *No Product type* /    In time:1228  Out time:1331  Total Treatment Time (min): 63  Total Timed Codes (min): 53  1:1 Treatment Time (min): 40   Visit #: 3 of 4    Treatment Area: Superior glenoid labrum lesion of left shoulder, initial encounter [S46.639Z]    SUBJECTIVE  Pt reports compliance with HEP. She took pain medication prior to therapy. She has stiffness, tightness, and pain in L shoulder.     Pain Level (0-10 scale): 6    Any medication changes, allergies to medications, adverse drug reactions, diagnosis change, or new procedure performed?: [x] No    [] Yes (see summary sheet for update)    OBJECTIVE  Modality rationale: decrease edema, decrease inflammation, and decrease pain   Min Type Additional Details    [] Estim: []Att   []Unatt  []TENS instruct                 []IFC  []Premod   []NMES                       []Other:  []w/US   []w/ice   []w/heat  Position:  Location:    []  Traction: [] Cervical       []Lumbar                       [] Prone          []Supine                       []Intermittent   []Continuous Lbs:  [] before manual  [] after manual    []  Ultrasound: []Continuous   [] Pulsed                           []1MHz   []3MHz Location:  W/cm2:    []  Iontophoresis with dexamethasone         Location: [] Take home patch   [] In clinic   10 [x]  Ice     []  heat  []  Ice massage Position: seated  Location: L shoulder    []  Vasopneumatic Device Pressure: [] lo [] med [] hi   Temp: [] lo [] med [] hi   [] Skin assessment post-treatment:  []intact []redness- no adverse reaction       []redness - adverse reaction:     30 min Therapeutic Exercise:  [x] See flow sheet :   Rationale: increase ROM, increase strength, improve coordination, and increase proprioception to improve the patient’s

## 2025-06-16 ENCOUNTER — HOSPITAL ENCOUNTER (OUTPATIENT)
Age: 55
Setting detail: RECURRING SERIES
Discharge: HOME | End: 2025-06-19
Payer: MEDICAID

## 2025-06-16 PROCEDURE — 97140 MANUAL THERAPY 1/> REGIONS: CPT

## 2025-06-16 PROCEDURE — 97110 THERAPEUTIC EXERCISES: CPT

## 2025-06-16 PROCEDURE — 97016 VASOPNEUMATIC DEVICE THERAPY: CPT

## 2025-06-16 NOTE — THERAPY RECERTIFICATION
ORIN BO [facility] REHABILITATION  PHYSICAL THERAPY  [facility], [address] [place]  [address]  Phone: [phone]    Fax: [phone]   Progress Note/CONTINUED PLAN OF CARE for PHYSICAL THERAPY          Patient Name: Tere Cardona : 1970   Treatment/Medical Diagnosis: Superior glenoid labrum lesion of left shoulder, initial encounter [S43.432A]   Onset Date: 25    Referral Source: Martin Spencer, * Start of Care (SOC): 04/10/25   Prior Hospitalization: See Medical History Provider #: [id number]   Prior Level of Function: Independent with pain   Comorbidities: See chart   Medications: Verified on Patient Summary List   Visits from SOC: 12 Missed Visits: 0       SUBJECTIVE  Pt reports pain limiting , plans to see MD next Monday.   Pain Level (0-10 scale): 8/10    Objective:   Palpation: tenderness noted grossly along L shoulder, UT and medical scapular border; incisions clean and dry, healing well      Posture: forward head , rounded shoulders     Shoulder ROM:                                                 AROM                       PROM    Left Right   Left Right   Flexion  88 154   140*     Extension             Abduction  78 142   110*     ER  35  WFL         ER @ 45 Degrees C2     40*     IR @ 45 Degrees  IR  L Glute    T6 level   48*     -AROM measured in sitting, PROM measured in supine  *indicative of pain     UE Strength:   [x] Unable to assess L UE at this time  due to surgical acuity                                                                          Left Right Pain   Flexion   4+ [] Yes   [] No   Abduction   4+ [] Yes   [] No   Extension   5 [] Yes   [] No   External Rotation   4+ [] Yes   [] No   Internal Rotation   5 [] Yes   [] No   Elbow Flexion   4+ [] Yes   [] No   Elbow Extension   4+ [] Yes   [] No         Other tests/comments: Quick DASH: 90.9%                      Short Term Goals: 6 weeks  Pt will be independent with HEP to

## 2025-06-16 NOTE — PROGRESS NOTES
PT DAILY TREATMENT NOTE     Patient Name: Tere Cardona  Date:2025  : 1970  [x]  Patient  Verified  Payor: [facility] MEDICAID / Plan: JD [facility] HEALTHKEEPERS PLUS / Product Type: *No Product type* /    In time:1030  Out time:1115  Total Treatment Time (min): 45  Total Timed Codes (min): 35  1:1 Treatment Time (min): 35   Visit # 12      Treatment Area: Superior glenoid labrum lesion of left shoulder, initial encounter [S43.134A]    SUBJECTIVE  Pt reports pain limiting , plans to see MD next Monday.   Pain Level (0-10 scale): 8/10  Any medication changes, allergies to medications, adverse drug reactions, diagnosis change, or new procedure performed?: [x] No    [] Yes (see summary sheet for update)        OBJECTIVE  Modality rationale: decrease edema, decrease inflammation, and decrease pain to improve the patient’s ability to move/heal optimally   Min Type Additional Details    [] Estim: []Att   []Unatt  []TENS instruct                 []IFC  []Premod []NMES                       []Other:  []w/US   []w/ice   []w/heat  Position:  Location:    []  Traction: [] Cervical       []Lumbar                       [] Prone          []Supine                       []Intermittent   []Continuous Lbs:  [] before manual  [] after manual    []  Ultrasound: []Continuous   [] Pulsed                           []1MHz   []3MHz Location:  W/cm2:    []  Iontophoresis with dexamethasone         Location: [] Take home patch   [] In clinic    []  Ice     []  heat  []  Ice massage Position:  Location:   10 [x]  Vasopneumatic Device Pressure: [x] lo [] med [] hi   Temp: [x] lo [] med [] hi   [x] Skin assessment post-treatment:  [x]intact []redness- no adverse reaction       []redness - adverse reaction:           20 min Therapeutic Exercise:  [x] See flow sheet :   Rationale: increase ROM, increase strength, and increase proprioception to improve the patient’s ability to maximize L UE function      15 min Manual

## 2025-07-09 ENCOUNTER — HOSPITAL ENCOUNTER (OUTPATIENT)
Age: 55
Setting detail: RECURRING SERIES
Discharge: HOME | End: 2025-07-12
Payer: MEDICAID

## 2025-07-09 PROCEDURE — 97140 MANUAL THERAPY 1/> REGIONS: CPT

## 2025-07-09 PROCEDURE — 97110 THERAPEUTIC EXERCISES: CPT

## 2025-07-09 PROCEDURE — 97016 VASOPNEUMATIC DEVICE THERAPY: CPT

## 2025-07-09 NOTE — PROGRESS NOTES
PT DAILY TREATMENT NOTE     Patient Name: Tere Cardona  Date:2025  : 1970  [x]  Patient  Verified  Payor: [facility] MEDICAID / Plan: JD [facility] HEALTHKEEPERS PLUS / Product Type: *No Product type* /    In time:835  Out time:919  Total Treatment Time (min): 44  Total Timed Codes (min): 34  1:1 Treatment Time (min):34    Visit # 1 of 6 (new auth)      Treatment Area: Superior glenoid labrum lesion of left shoulder, initial encounter [S43.432A]    SUBJECTIVE  Pt reported that she suspects that she will need a manipulation according to PA .    Pain Level (0-10 scale): 4  Any medication changes, allergies to medications, adverse drug reactions, diagnosis change, or new procedure performed?: [x] No    [] Yes (see summary sheet for update)        OBJECTIVE  Modality rationale: decrease edema, decrease inflammation, and decrease pain to improve the patient’s ability to move/heal optimally   Min Type Additional Details    [] Estim: []Att   []Unatt  []TENS instruct                 []IFC  []Premod []NMES                       []Other:  []w/US   []w/ice   []w/heat  Position:  Location:    []  Traction: [] Cervical       []Lumbar                       [] Prone          []Supine                       []Intermittent   []Continuous Lbs:  [] before manual  [] after manual    []  Ultrasound: []Continuous   [] Pulsed                           []1MHz   []3MHz Location:  W/cm2:    []  Iontophoresis with dexamethasone         Location: [] Take home patch   [] In clinic    []  Ice     []  heat  []  Ice massage Position:  Location:   10 [x]  Vasopneumatic Device Pressure: [x] lo [] med [] hi   Temp: [x] lo [] med [] hi   [x] Skin assessment post-treatment:  [x]intact []redness- no adverse reaction       []redness - adverse reaction:           20 min Therapeutic Exercise:  [x] See flow sheet :   Rationale: increase ROM, increase strength, and increase proprioception to improve return to prior level of

## 2025-07-11 ENCOUNTER — HOSPITAL ENCOUNTER (OUTPATIENT)
Age: 55
Setting detail: RECURRING SERIES
Discharge: HOME | End: 2025-07-14
Payer: MEDICAID

## 2025-07-11 PROCEDURE — 97016 VASOPNEUMATIC DEVICE THERAPY: CPT

## 2025-07-11 PROCEDURE — 97140 MANUAL THERAPY 1/> REGIONS: CPT

## 2025-07-11 PROCEDURE — 97110 THERAPEUTIC EXERCISES: CPT

## 2025-07-11 NOTE — PROGRESS NOTES
PT DAILY TREATMENT NOTE     Patient Name: Tere Cardona  Date:2025  : 1970  [x]  Patient  Verified  Payor: [facility] MEDICAID / Plan: JD [facility] HEALTHKEEPERS PLUS / Product Type: *No Product type* /    In time:832  Out time:927  Total Treatment Time (min): 55  Total Timed Codes (min): 45  1:1 Treatment Time (min): 45   Visit #: 2 of 6    Treatment Area: Superior glenoid labrum lesion of left shoulder, initial encounter [S43.432E]    SUBJECTIVE  Pt states she is experiencing pain , stiffness, tightness and soreness.     Pain Level (0-10 scale): 8    Any medication changes, allergies to medications, adverse drug reactions, diagnosis change, or new procedure performed?: [x] No    [] Yes (see summary sheet for update)    OBJECTIVE  Modality rationale: Vaso post session to decrease pain and edema from exercises   Min Type Additional Details    [] Estim: []Att   []Unatt  []TENS instruct                 []IFC  []Premod   []NMES                       []Other:  []w/US   []w/ice   []w/heat  Position:  Location:    []  Traction: [] Cervical       []Lumbar                       [] Prone          []Supine                       []Intermittent   []Continuous Lbs:  [] before manual  [] after manual    []  Ultrasound: []Continuous   [] Pulsed                           []1MHz   []3MHz Location:  W/cm2:    []  Iontophoresis with dexamethasone         Location: [] Take home patch   [] In clinic    []  Ice     []  heat  []  Ice massage Position: seated   Location: L shoulder   10 [x]  Vasopneumatic Device Pressure: [x] lo [] med [] hi   Temp: [] lo [x] med [] hi   [] Skin assessment post-treatment:  []intact []redness- no adverse reaction       []redness - adverse reaction:     30 min Therapeutic Exercise:  [x] See flow sheet :   Rationale: increase ROM, increase strength, improve coordination, and increase proprioception to improve the patient’s ability to functionally use L UE.     15 min Manual

## 2025-07-15 ENCOUNTER — HOSPITAL ENCOUNTER (OUTPATIENT)
Age: 55
Setting detail: RECURRING SERIES
Discharge: HOME | End: 2025-07-18
Payer: MEDICAID

## 2025-07-15 PROCEDURE — 97110 THERAPEUTIC EXERCISES: CPT

## 2025-07-15 PROCEDURE — 97140 MANUAL THERAPY 1/> REGIONS: CPT

## 2025-07-15 NOTE — PROGRESS NOTES
PT DAILY TREATMENT NOTE 8-    Patient Name: Tere Cardona  Date:7/15/2025  : 1970  [x]  Patient  Verified  Payor: [facility] MEDICAID / Plan: JD [facility] HEALTHKEEPERS PLUS / Product Type: *No Product type* /    In time:1034  Out time:1115  Total Treatment Time (min): 41  Total Timed Codes (min): 36  1:1 Treatment Time (min): 31   Visit #: 3 of 6    Treatment Area: Superior glenoid labrum lesion of left shoulder, initial encounter [S43.432A]    SUBJECTIVE  Chief complaint is stiffness and tightness in L shoulder.     Pain Level (0-10 scale): 7    Any medication changes, allergies to medications, adverse drug reactions, diagnosis change, or new procedure performed?: [x] No    [] Yes (see summary sheet for update)    OBJECTIVE  Modality rationale: decrease edema, decrease inflammation, and increase tissue extensibility   Min Type Additional Details    [] Estim: []Att   []Unatt  []TENS instruct                 []IFC  []Premod   []NMES                       []Other:  []w/US   []w/ice   []w/heat  Position:  Location:    []  Traction: [] Cervical       []Lumbar                       [] Prone          []Supine                       []Intermittent   []Continuous Lbs:  [] before manual  [] after manual    []  Ultrasound: []Continuous   [] Pulsed                           []1MHz   []3MHz Location:  W/cm2:    []  Iontophoresis with dexamethasone         Location: [] Take home patch   [] In clinic    []  Ice     []  heat  []  Ice massage Position:  Location:   5 [x]  Vasopneumatic Device Pressure: [x] lo [] med [] hi   Temp: [] lo [x] med [] hi   [] Skin assessment post-treatment:  []intact []redness- no adverse reaction       []redness - adverse reaction:     24 min Therapeutic Exercise:  [x] See flow sheet :   Rationale: increase ROM, increase strength, improve coordination, and increase proprioception to improve the patient’s ability to complete household activities.    12 min Manual Therapy:  PROM

## 2025-07-17 ENCOUNTER — HOSPITAL ENCOUNTER (OUTPATIENT)
Age: 55
Setting detail: RECURRING SERIES
Discharge: HOME | End: 2025-07-20
Payer: MEDICAID

## 2025-07-17 PROCEDURE — 97110 THERAPEUTIC EXERCISES: CPT

## 2025-07-17 NOTE — THERAPY RECERTIFICATION
ORIN BO [facility] REHABILITATION  PHYSICAL THERAPY  [facility], [address] [place]  [address]  Phone: [phone]    Fax: [phone]   Progress Note/CONTINUED PLAN OF CARE for PHYSICAL THERAPY          Patient Name: Tere Cardona : 1970   Treatment/Medical Diagnosis: Superior glenoid labrum lesion of left shoulder, initial encounter [S43.432A]   Onset Date: 25    Referral Source: Martin Spencer, * Start of Care (SOC): 04/10/25   Prior Hospitalization: See Medical History Provider #: [id number]   Prior Level of Function: Independent with pain   Comorbidities: See chart   Medications: Verified on Patient Summary List   Visits from SOC: 16 Missed Visits: 0         SUBJECTIVE  Pt plans to follow-up with surgeon next week and discuss alternative treatment/manipulation. Pt is looking forward to going on cruise next month to [place].   Pain Level (0-10 scale): 10    Palpation: tenderness noted grossly along L shoulder, UT and medical scapular border; incisions clean and dry, healing well      Posture: forward head , rounded shoulders     Shoulder ROM:                                                 AROM                       PROM    Left Right   Left Right   Flexion  90 154   130*     Extension             Abduction  65 142   105*     ER  35  WFL         ER @ 45 Degrees C2     30*     IR @ 45 Degrees  IR  L Glute    T6 level   40*     -AROM measured in sitting, PROM measured in supine  *indicative of pain     UE Strength:   [x] Unable to assess L UE at this time  due to surgical acuity                                                                          Left Right Pain   Flexion   4+ [] Yes   [] No   Abduction   4+ [] Yes   [] No   Extension   5 [] Yes   [] No   External Rotation   4+ [] Yes   [] No   Internal Rotation   5 [] Yes   [] No   Elbow Flexion   4+ [] Yes   [] No   Elbow Extension   4+ [] Yes   [] No         Other tests/comments: Quick DASH:

## 2025-07-17 NOTE — PROGRESS NOTES
PT DAILY TREATMENT NOTE 8-    Patient Name: Tere Cardona  Date:2025  : 1970  [x]  Patient  Verified  Payor: [facility] MEDICAID / Plan: JD [facility] HEALTHKEEPERS PLUS / Product Type: *No Product type* /    In time:1011  Out time:1050  Total Treatment Time (min): 39  Total Timed Codes (min): 39  1:1 Treatment Time (min): 39   Visit # 6      Treatment Area: Superior glenoid labrum lesion of left shoulder, initial encounter [S45.846A]    SUBJECTIVE  Pt plans to follow-up with surgeon next week and discuss alternative treatment/manipulation. Pt is looking forward to going on cruise next month to [place].   Pain Level (0-10 scale): 710  Any medication changes, allergies to medications, adverse drug reactions, diagnosis change, or new procedure performed?: [x] No    [] Yes (see summary sheet for update)        OBJECTIVE  Modality rationale: Pt declined.   Min Type Additional Details    [] Estim: []Att   []Unatt  []TENS instruct                 []IFC  []Premod []NMES                       []Other:  []w/US   []w/ice   []w/heat  Position:  Location:    []  Traction: [] Cervical       []Lumbar                       [] Prone          []Supine                       []Intermittent   []Continuous Lbs:  [] before manual  [] after manual    []  Ultrasound: []Continuous   [] Pulsed                           []1MHz   []3MHz Location:  W/cm2:    []  Iontophoresis with dexamethasone         Location: [] Take home patch   [] In clinic    []  Ice     []  heat  []  Ice massage Position:  Location:    []  Vasopneumatic Device Pressure: [] lo [] med [] hi   Temp: [] lo [] med [] hi           39 min Therapeutic Exercise:  [x] See flow sheet :   Rationale: increase ROM, increase strength, improve coordination, and increase proprioception to improve the patient’s ability to maximize pain-free daily activity and optimal L UE function.              With TE  TA   NR  GT   Misc Patient Education: [x] Review HEP    []

## 2025-07-18 ENCOUNTER — HOSPITAL ENCOUNTER (EMERGENCY)
Age: 55
Discharge: HOME | End: 2025-07-18

## 2025-07-21 ENCOUNTER — HOSPITAL ENCOUNTER (OUTPATIENT)
Age: 55
Setting detail: RECURRING SERIES
Discharge: HOME | End: 2025-07-24
Payer: MEDICAID

## 2025-07-21 PROCEDURE — 97110 THERAPEUTIC EXERCISES: CPT

## 2025-07-21 NOTE — PROGRESS NOTES
PT DAILY TREATMENT NOTE     Patient Name: Tere Cardona  Date:2025  : 1970  [x]  Patient  Verified  Payor: [facility] MEDICAID / Plan: [facility] [employer]US / Product Type: *No Product type* /    In time:1000  Out time: 1034  Total Treatment Time (min): 34  Total Timed Codes (min): 34  1:1 Treatment Time (min):34    Visit # 5 of 6 (new auth)      Treatment Area: Superior glenoid labrum lesion of left shoulder, initial encounter [S43.432A]    SUBJECTIVE  Pt reported that she continues suspects that she will need a manipulation according to PA .    Pain Level (0-10 scale): 5  Any medication changes, allergies to medications, adverse drug reactions, diagnosis change, or new procedure performed?: [x] No    [] Yes (see summary sheet for update)        OBJECTIVE  Modality rationale: decrease edema, decrease inflammation, and decrease pain to improve the patient’s ability to move/heal optimally   Min Type Additional Details    [] Estim: []Att   []Unatt  []TENS instruct                 []IFC  []Premod []NMES                       []Other:  []w/US   []w/ice   []w/heat  Position:  Location:    []  Traction: [] Cervical       []Lumbar                       [] Prone          []Supine                       []Intermittent   []Continuous Lbs:  [] before manual  [] after manual    []  Ultrasound: []Continuous   [] Pulsed                           []1MHz   []3MHz Location:  W/cm2:    []  Iontophoresis with dexamethasone         Location: [] Take home patch   [] In clinic    []  Ice     []  heat  []  Ice massage Position:  Location:    []  Vasopneumatic Device Pressure: [] lo [] med [] hi   Temp: [] lo [] med [] hi   [] Skin assessment post-treatment:  [x]intact []redness- no adverse reaction       []redness - adverse reaction:      34 min Therapeutic Exercise:  [x] See flow sheet :   Rationale: increase ROM, increase strength, and increase proprioception to improve return to prior level of

## 2025-07-23 ENCOUNTER — APPOINTMENT (OUTPATIENT)
Age: 55
End: 2025-07-23
Payer: MEDICAID

## 2025-07-24 ENCOUNTER — OFFICE VISIT (OUTPATIENT)
Age: 55
End: 2025-07-24
Payer: MEDICAID

## 2025-07-24 ENCOUNTER — HOSPITAL ENCOUNTER (OUTPATIENT)
Age: 55
Setting detail: RECURRING SERIES
Discharge: HOME | End: 2025-07-27
Payer: MEDICAID

## 2025-07-24 DIAGNOSIS — S83.261A PERIPHERAL TEAR OF LATERAL MENISCUS OF RIGHT KNEE AS CURRENT INJURY, INITIAL ENCOUNTER: Primary | ICD-10-CM

## 2025-07-24 PROCEDURE — 97110 THERAPEUTIC EXERCISES: CPT

## 2025-07-24 PROCEDURE — 99212 OFFICE O/P EST SF 10 MIN: CPT | Performed by: ORTHOPAEDIC SURGERY

## 2025-07-24 NOTE — PROGRESS NOTES
PT DAILY TREATMENT NOTE     Patient Name: Tere Cardona  Date:2025  : 1970  [x]  Patient  Verified  Payor: [facility] MEDICAID / Plan: [facility] HE[employer]ERS PLUS / Product Type: *No Product type* /    In time:9:00  Out time:9:28  Total Treatment Time (min): 28  Total Timed Codes (min): 28  1:1 Treatment Time (min): 28   Visit #: 6 of 6    Treatment Area: Superior glenoid labrum lesion of left shoulder, initial encounter [S43.432A]    SUBJECTIVE  Pt arrives and stated that she wants to only do the pulleys, the wand and to be stretched out. \"I'm going to be getting an MRI on my shoulder.\"    Pain Level (0-10 scale): 810    Any medication changes, allergies to medications, adverse drug reactions, diagnosis change, or new procedure performed?: [x] No    [] Yes (see summary sheet for update)        OBJECTIVE  Modality rationale: Pt declined modalities.   Min Type Additional Details    [] Estim: []Att   []Unatt  []TENS instruct                 []IFC  []Premod []NMES                       []Other:  []w/US   []w/ice   []w/heat  Position:  Location:    []  Traction: [] Cervical       []Lumbar                       [] Prone          []Supine                       []Intermittent   []Continuous Lbs:  [] before manual  [] after manual    []  Ultrasound: []Continuous   [] Pulsed                           []1MHz   []3MHz Location:  W/cm2:    []  Iontophoresis with dexamethasone         Location: [] Take home patch   [] In clinic    []  Ice     []  heat  []  Ice massage Position:  Location:    []  Vasopneumatic Device Pressure: [] lo [] med [] hi   Temp: [] lo [] med [] hi   [] Skin assessment post-treatment:  []intact []redness- no adverse reaction       []redness - adverse reaction:      min Group Therapy: Time overlapped with another patient      28 min Therapeutic Exercise:  [x] See flow sheet :   Rationale: increase ROM, increase strength, and increase proprioception to improve the patient’s

## 2025-07-24 NOTE — PROGRESS NOTES
Name: Tere Cardona    : 1970     [facility]  [address], [address]  [place] [address]  Dept: [phone]  Dept Fax: [phone]   Chief Complaint   Patient presents with    Knee Pain     There were no vitals taken for this visit.   Allergies   Allergen Reactions    Levofloxacin Nausea And Vomiting and Rash     Other Reaction(s): Not available     Current Outpatient Medications   Medication Sig Dispense Refill    cloNIDine (CATAPRES) 0.1 MG tablet Take 1 tablet by mouth 2 times daily      folic acid (FOLVITE) 1 MG tablet Take 1 tablet by mouth daily      ferrous sulfate (IRON 325) 325 (65 Fe) MG tablet Take 1 tablet by mouth daily (with breakfast)      Cholecalciferol (VITAMIN D3) 1.25 MG (37227 UT) CAPS Take 1 capsule by mouth once a week      Cyanocobalamin (VITAMIN B 12) 250 MCG LOZG Take by mouth      albuterol sulfate HFA (PROVENTIL;VENTOLIN;PROAIR) 108 (90 Base) MCG/ACT inhaler Inhale 2 puffs into the lungs every 4 hours as needed for Wheezing 18 g 3    fexofenadine-pseudoephedrine (ALLEGRA-D 12HR)  MG per extended release tablet Take 1 tablet by mouth 2 times daily 20 tablet 0    acetaminophen (TYLENOL) 500 MG tablet Take 2 tablets by mouth 4 times daily as needed for Pain 30 tablet 0    citalopram (CELEXA) 10 MG tablet Take 1 tablet by mouth daily      metoprolol tartrate (LOPRESSOR) 25 MG tablet Take 1 tablet by mouth daily      metFORMIN (GLUCOPHAGE) 500 MG tablet Take 1 tablet by mouth daily (Patient not taking: Reported on 2025)      pantoprazole (PROTONIX) 40 MG tablet Take 1 tablet by mouth daily      zolpidem (AMBIEN) 10 MG tablet Take 1 tablet by mouth.       No current facility-administered medications for this visit.       Patient Active Problem List   Diagnosis    Gastroesophageal reflux disease without esophagitis    Lumbosacral spondylosis without myelopathy    Pain in

## 2025-07-25 ENCOUNTER — TRANSCRIBE ORDERS (OUTPATIENT)
Facility: HOSPITAL | Age: 55
End: 2025-07-25

## 2025-07-25 ENCOUNTER — TRANSCRIBE ORDERS (OUTPATIENT)
Age: 55
End: 2025-07-25

## 2025-07-25 ENCOUNTER — HOSPITAL ENCOUNTER (OUTPATIENT)
Age: 55
Discharge: HOME | End: 2025-07-28
Payer: MEDICAID

## 2025-07-25 ENCOUNTER — HOSPITAL ENCOUNTER (OUTPATIENT)
Age: 55
Setting detail: SPECIMEN
Discharge: HOME | End: 2025-07-28

## 2025-07-25 DIAGNOSIS — M75.00 ADHESIVE CAPSULITIS OF SHOULDER, UNSPECIFIED LATERALITY: Primary | ICD-10-CM

## 2025-07-25 DIAGNOSIS — Z01.818 PRE-OPERATIVE CLEARANCE: Primary | ICD-10-CM

## 2025-07-25 DIAGNOSIS — Z01.818 PRE-OPERATIVE CLEARANCE: ICD-10-CM

## 2025-07-25 LAB — LABCORP DRAW FEE: NORMAL

## 2025-07-25 PROCEDURE — 93005 ELECTROCARDIOGRAM TRACING: CPT

## 2025-07-25 PROCEDURE — 99001 SPECIMEN HANDLING PT-LAB: CPT

## 2025-07-25 PROCEDURE — 71046 X-RAY EXAM CHEST 2 VIEWS: CPT

## 2025-07-27 LAB
EKG ATRIAL RATE: 66 BPM
EKG DIAGNOSIS: NORMAL
EKG P AXIS: 23 DEGREES
EKG P-R INTERVAL: 176 MS
EKG Q-T INTERVAL: 420 MS
EKG QRS DURATION: 76 MS
EKG QTC CALCULATION (BAZETT): 440 MS
EKG R AXIS: 42 DEGREES
EKG T AXIS: 23 DEGREES
EKG VENTRICULAR RATE: 66 BPM

## 2025-08-01 ENCOUNTER — HOSPITAL ENCOUNTER (OUTPATIENT)
Age: 55
Discharge: HOME | End: 2025-08-01
Attending: ORTHOPAEDIC SURGERY
Payer: MEDICAID

## 2025-08-01 DIAGNOSIS — M75.00 ADHESIVE CAPSULITIS OF SHOULDER, UNSPECIFIED LATERALITY: ICD-10-CM

## 2025-08-01 PROCEDURE — 73221 MRI JOINT UPR EXTREM W/O DYE: CPT

## 2025-08-12 DIAGNOSIS — M23.251 DERANGEMENT OF POSTERIOR HORN OF LATERAL MENISCUS OF RIGHT KNEE DUE TO OLD INJURY: ICD-10-CM

## 2025-08-12 DIAGNOSIS — M25.50 PAIN IN JOINT, MULTIPLE SITES: ICD-10-CM

## 2025-08-12 DIAGNOSIS — K21.9 GASTROESOPHAGEAL REFLUX DISEASE WITHOUT ESOPHAGITIS: ICD-10-CM

## 2025-08-12 DIAGNOSIS — K21.9 GASTROESOPHAGEAL REFLUX DISEASE WITHOUT ESOPHAGITIS: Primary | ICD-10-CM

## 2025-08-19 ENCOUNTER — HOSPITAL ENCOUNTER (OUTPATIENT)
Age: 55
Discharge: HOME | End: 2025-08-22

## 2025-08-19 ENCOUNTER — HOSPITAL ENCOUNTER (OUTPATIENT)
Age: 55
Discharge: HOME | End: 2025-08-22
Payer: MEDICAID

## 2025-08-19 ENCOUNTER — TRANSCRIBE ORDERS (OUTPATIENT)
Facility: HOSPITAL | Age: 55
End: 2025-08-19

## 2025-08-19 DIAGNOSIS — M54.50 LOW BACK PAIN, UNSPECIFIED BACK PAIN LATERALITY, UNSPECIFIED CHRONICITY, UNSPECIFIED WHETHER SCIATICA PRESENT: Primary | ICD-10-CM

## 2025-08-19 DIAGNOSIS — M25.50 PAIN IN JOINT, MULTIPLE SITES: ICD-10-CM

## 2025-08-19 DIAGNOSIS — M23.251 DERANGEMENT OF POSTERIOR HORN OF LATERAL MENISCUS OF RIGHT KNEE DUE TO OLD INJURY: ICD-10-CM

## 2025-08-19 DIAGNOSIS — K21.9 GASTROESOPHAGEAL REFLUX DISEASE WITHOUT ESOPHAGITIS: ICD-10-CM

## 2025-08-19 LAB
EKG ATRIAL RATE: 65 BPM
EKG DIAGNOSIS: NORMAL
EKG P AXIS: 25 DEGREES
EKG P-R INTERVAL: 174 MS
EKG Q-T INTERVAL: 400 MS
EKG QRS DURATION: 72 MS
EKG QTC CALCULATION (BAZETT): 416 MS
EKG R AXIS: 44 DEGREES
EKG T AXIS: 30 DEGREES
EKG VENTRICULAR RATE: 65 BPM
LABCORP DRAW FEE: NORMAL

## 2025-08-19 PROCEDURE — 93005 ELECTROCARDIOGRAM TRACING: CPT

## 2025-08-19 PROCEDURE — 99001 SPECIMEN HANDLING PT-LAB: CPT

## 2025-08-20 LAB
BUN SERPL-MCNC: 9 MG/DL (ref 6–24)
BUN/CREAT SERPL: 12 (ref 9–23)
CALCIUM SERPL-MCNC: 9.2 MG/DL (ref 8.7–10.2)
CHLORIDE SERPL-SCNC: 102 MMOL/L (ref 96–106)
CO2 SERPL-SCNC: 22 MMOL/L (ref 20–29)
CREAT SERPL-MCNC: 0.77 MG/DL (ref 0.57–1)
EGFRCR SERPLBLD CKD-EPI 2021: 92 ML/MIN/1.73
ERYTHROCYTE [DISTWIDTH] IN BLOOD BY AUTOMATED COUNT: 12.3 % (ref 11.7–15.4)
GLUCOSE SERPL-MCNC: 100 MG/DL (ref 70–99)
HCT VFR BLD AUTO: 36.2 % (ref 34–46.6)
HGB BLD-MCNC: 11.8 G/DL (ref 11.1–15.9)
MCH RBC QN AUTO: 30.1 PG (ref 26.6–33)
MCHC RBC AUTO-ENTMCNC: 32.6 G/DL (ref 31.5–35.7)
MCV RBC AUTO: 92 FL (ref 79–97)
PLATELET # BLD AUTO: 269 X10E3/UL (ref 150–450)
POTASSIUM SERPL-SCNC: 4.2 MMOL/L (ref 3.5–5.2)
RBC # BLD AUTO: 3.92 X10E6/UL (ref 3.77–5.28)
SODIUM SERPL-SCNC: 136 MMOL/L (ref 134–144)
WBC # BLD AUTO: 7.1 X10E3/UL (ref 3.4–10.8)

## (undated) DEVICE — Device: Brand: DEFENDO VALVE AND CONNECTOR KIT

## (undated) DEVICE — TUBING, SUCTION, 9/32" X 10', STRAIGHT: Brand: MEDLINE

## (undated) DEVICE — GOWN,AURORA,FABRIC-REINFORCED,X-LARGE: Brand: MEDLINE

## (undated) DEVICE — CONMED SCOPE SAVER BITE BLOCK, 20X27 MM: Brand: SCOPE SAVER

## (undated) DEVICE — SOL IRR STRL H2O 500ML STRL --

## (undated) DEVICE — SOL IRR STRL H2O 1000ML BTL --

## (undated) DEVICE — TUBING INSUFFLATION CAP W/ EXT CARBON DIOX ENDO SMARTCAP

## (undated) DEVICE — KIT COLON W/ 1.1OZ LUB AND 2 END

## (undated) DEVICE — ENDOGATOR TUBING FOR BOSTON SCIENTIFIC ENDOSTAT II PUMP, OLYMPUS OFP PUMP OR ENDO STRATUS PUMP: Brand: ENDOGATOR